# Patient Record
Sex: MALE | Race: BLACK OR AFRICAN AMERICAN | Employment: FULL TIME | URBAN - METROPOLITAN AREA
[De-identification: names, ages, dates, MRNs, and addresses within clinical notes are randomized per-mention and may not be internally consistent; named-entity substitution may affect disease eponyms.]

---

## 2018-04-24 ENCOUNTER — OFFICE VISIT (OUTPATIENT)
Dept: ORTHOPEDIC SURGERY | Age: 53
End: 2018-04-24

## 2018-04-24 VITALS
DIASTOLIC BLOOD PRESSURE: 95 MMHG | HEART RATE: 62 BPM | RESPIRATION RATE: 16 BRPM | WEIGHT: 229.4 LBS | BODY MASS INDEX: 31.07 KG/M2 | SYSTOLIC BLOOD PRESSURE: 144 MMHG | HEIGHT: 72 IN

## 2018-04-24 DIAGNOSIS — M96.1 LUMBAR POSTLAMINECTOMY SYNDROME: Primary | ICD-10-CM

## 2018-04-24 DIAGNOSIS — M47.817 LUMBOSACRAL SPONDYLOSIS WITHOUT MYELOPATHY: ICD-10-CM

## 2018-04-24 DIAGNOSIS — M51.36 DDD (DEGENERATIVE DISC DISEASE), LUMBAR: ICD-10-CM

## 2018-04-24 RX ORDER — TRAMADOL HYDROCHLORIDE 50 MG/1
TABLET ORAL
Refills: 0 | COMMUNITY
Start: 2018-04-11 | End: 2020-05-26

## 2018-04-24 RX ORDER — TOPIRAMATE 25 MG/1
TABLET ORAL
Qty: 90 TAB | Refills: 1 | Status: SHIPPED | OUTPATIENT
Start: 2018-04-24 | End: 2020-05-26

## 2018-04-24 NOTE — PROGRESS NOTES
[facility]  [address], [place]   Phone: [phone]  Fax: [phone]        INITIAL CONSULTATION      HISTORY OF PRESENT ILLNESS:  Hugo Shetty is a 45 y.o. male whom is referred from Dr. Paulino Jon secondary to progressive left flank pain ongoing since ~ mid-March 2018 after bending over to  his shoe. His pain is exacerbated with prolonged standing and sitting. He rates pain 4-10/10. This is a patient with a diagnosis of postlaminectomy syndrome with previous h/o L4-S1 fusion from 1999/2000 performed by Dr. Jewel Soto. Pt states he was symptom-free until his recent recurrence. Note from Dr. Paulino Jon dated 3/23/18 indicating patient was seen for possible shingles and was treated with Prednisone. Reviewing his notes, patient apparently had abnormal renal lab studies with creatinine 1.8 and BUN 28 (repeat lab studies revealed creatinine 1.2, BUN 18). Pt receive no relief with Prednisone from 3/23/18. He has tried Tylenol without relief. Pt has taken Topamax in the past. PMHx includes gout. Pt denies recent lumbar blocks or PT. Pt denies h/o DM or glaucoma. Pt denies change in bowel or bladder habits. Pt denies fever, weight loss, or skin changes. Rib uni w/ chest XR dated 3/23/2018 per report revealed no active intrathoracic disease. No acute rib fracture. Thoracolumbar spine XR dated 3/23/2018 reviewed. Per report, no acute pathology. Postoperative change at L4-S1. Multilevel degenerative changes most significant at L2-3. Mild convex left scoliosis with anterior subluxation L4 on L5. Reviewing the films myself, bony fusion appears to be solid at L4-5, maybe not as solid at the L5-S1 level. Vacuum disc at L2-3 with significant disc space narrowing. Anterior osteophytes noted throughout the lumbar spine. No acute pathology identified. The patient is LHD.  reviewed. Body mass index is 31.11 kg/(m^2).         PCP: Paulino Jon MD    Past Medical History:   Diagnosis Date    Elevated PSA     Gout     Hypercalcemia     Hyperlipidemia           Past Surgical History:   Procedure Laterality Date    HX BACK SURGERY      HX HIP REPLACEMENT Right     HX HIP REPLACEMENT Left     HX KNEE REPLACEMENT Left     HX OTHER SURGICAL      SHOULDER SURGERY         Social History   Substance Use Topics    Smoking status: Former Smoker     Quit date: 7/7/2003    Smokeless tobacco: Never Used    Alcohol use 0.0 oz/week     0 Standard drinks or equivalent per week     Work status: Not available. Marital status: Not available. Current Outpatient Prescriptions   Medication Sig Dispense Refill    topiramate (TOPAMAX) 25 mg tablet 3 tabs PO QHS 90 Tab 1    colchicine 0.6 mg tablet   2    allopurinol (ZYLOPRIM) 100 mg tablet   5    valsartan (DIOVAN) 160 mg tablet Take  by mouth daily.  atorvastatin (LIPITOR) 10 mg tablet Take  by mouth daily.  diclofenac EC (VOLTAREN) 75 mg EC tablet Take  by mouth.  metoprolol (LOPRESSOR) 100 mg tablet Take  by mouth two (2) times a day.  traMADol (ULTRAM) 50 mg tablet take 1 tablet by mouth every 6 hours if needed  0    acyclovir (ZOVIRAX) 200 mg capsule Take  by mouth every four (4) hours (while awake).  loteprednol etabonate (LOTEMAX) 0.5 % ophthalmic suspension Administer 1 Drop to both eyes four (4) times daily. No Known Allergies         Family History   Problem Relation Age of Onset    Heart Disease Mother     Other Father      FAMILY HX UNKNOWN    Other Mother      SARCOIDOSIS    Other Mother      CONGESTIVE HEART FAILURE         REVIEW OF SYSTEMS  Constitutional symptoms: Negative  Eyes: Negative  Ears, Nose, Throat, and Mouth: Negative  Cardiovascular: Negative  Respiratory: Negative  Genitourinary: Negative  Integumentary (Skin and/or breast): Negative  Musculoskeletal: Positive for left flank pain. Extremities: Negative for edema.   Endocrine/Rheumatologic: Negative  Hematologic/Lymphatic: Negative  Allergic/Immunologic: Negative  Psychiatric: Negative       PHYSICAL EXAMINATION    Visit Vitals    BP (!) 144/95  Comment: pt has not taken his bp med yet    Pulse 62    Resp 16    Ht 6' (1.829 m)    Wt 104.1 kg (229 lb 6.4 oz)    BMI 31.11 kg/m2       CONSTITUTIONAL: NAD, A&O x 3  HEART: Regular rate and rhythm  ABDOMEN: Positive bowel sounds, soft, nontender, and nondistended  LUNGS: Clear to auscultation bilaterally. SKIN: Negative for rash. RANGE OF MOTION: The patient has full passive range of motion in all four extremities. SENSATION: Sensation is intact to light touch throughout. MOTOR:   Straight Leg Raise: Negative, bilateral  Landa: Negative, bilateral   Deep tendon reflexes are 0 at the brachioradialis, biceps, and triceps. Deep tendon reflexes are 0 at the knees and ankles bilaterally. Shoulder AB/Flex Elbow Flex Wrist Ext Elbow Ext Wrist Flex Hand Intrin Tone   Right +4/5 +4/5 +4/5 +4/5 +4/5 +4/5 +4/5   Left +4/5 +4/5 +4/5 +4/5 +4/5 +4/5 +4/5              Hip Flex Knee Ext Knee Flex Ankle DF GTE Ankle PF Tone   Right +4/5 +4/5 +4/5 +4/5 +4/5 +4/5 +4/5   Left +4/5 +4/5 +4/5 +4/5 +4/5 +4/5 +4/5         ASSESSMENT   Diagnoses and all orders for this visit:    1. Lumbar postlaminectomy syndrome    2. Lumbosacral spondylosis without myelopathy    3. DDD (degenerative disc disease), lumbar    Other orders  -     topiramate (TOPAMAX) 25 mg tablet; 3 tabs PO QHS           IMPRESSIONS/RECOMMENDATIONS:  The patient presents for left flank pain x 1 month. He is neurologically intact. The most significant findings were noted to be at the L2-3 level on his thoracolumbar radiographs. We discussed multiple treatment options. patient would like to proceed conservatively. I will try him on Topamax 25 mg 3 tabs qhs ramped. The risks, benefits, and potential side effects of this medication were discussed. Patient understands and wishes to proceed.  Patient advised to call the office if intolerant to new medication. He declines physical therapy. I will see the patient back in 1 month's time or earlier if needed. Written by Miquel Aquino, as dictated by Lee Sim MD  I examined the patient, reviewed and agree with the note.

## 2018-04-24 NOTE — MR AVS SNAPSHOT
[facility] 
 
 
 [address] [facility] 
[phone] Patient: Joseluis Treviño MRN: [id number] Visit Information Date & Time Provider Department Dept. Phone Encounter #  
 2018 10:00 AM Ervin Hyman MD [facility] [place] [phone] [id number] Upcoming Health Maintenance Date Due Hepatitis C Screening 1965 DTaP/Tdap/Td series (1 - Tdap) 10/13/1984 FOBT Q 1 YEAR AGE 50-75 10/13/2013 Influenza Age 5 to Adult 2017 Allergies as of 2018  Review Complete On: 2018 By: Trevor Joe LPN No Known Allergies Current Immunizations  Never Reviewed No immunizations on file. Not reviewed this visit You Were Diagnosed With   
  
 Codes Comments Lumbar postlaminectomy syndrome    -  Primary ICD-10-CM: M96.1 ICD-9-CM: 722.83 Lumbosacral spondylosis without myelopathy     ICD-10-CM: N86.886 ICD-9-CM: 721.3 DDD (degenerative disc disease), lumbar     ICD-10-CM: M51.36 
ICD-9-CM: 722.52 Vitals BP Pulse Resp Height(growth percentile) Weight(growth percentile) BMI  
 (!) 144/95 62 16 6' (1.829 m) 229 lb 6.4 oz (104.1 kg) 31.11 kg/m2 Smoking Status Former Smoker Vitals History BMI and BSA Data Body Mass Index Body Surface Area  
 31.11 kg/m 2 2.3 m 2 Preferred Pharmacy Pharmacy Name Phone [facility] [address] [facility] [phone] Your Updated Medication List  
  
   
This list is accurate as of 18 11:39 AM.  Always use your most recent med list.  
  
  
  
  
 acyclovir 200 mg capsule Commonly known as:  ZOVIRAX Take  by mouth every four (4) hours (while awake). allopurinol 100 mg tablet Commonly known as:  ZYLOPRIM  
  
 colchicine 0.6 mg tablet  
  
 diclofenac EC 75 mg EC tablet Commonly known as:  VOLTAREN  
 Take  by mouth. DIOVAN 160 mg tablet Generic drug:  valsartan Take  by mouth daily. LIPITOR 10 mg tablet Generic drug:  atorvastatin Take  by mouth daily. LOTEMAX 0.5 % ophthalmic suspension Generic drug:  loteprednol etabonate Administer 1 Drop to both eyes four (4) times daily. metoprolol tartrate 100 mg IR tablet Commonly known as:  LOPRESSOR Take  by mouth two (2) times a day. topiramate 25 mg tablet Commonly known as:  TOPAMAX 3 tabs PO QHS  
  
 traMADol 50 mg tablet Commonly known as:  ULTRAM  
take 1 tablet by mouth every 6 hours if needed Prescriptions Sent to Pharmacy Refills  
 topiramate (TOPAMAX) 25 mg tablet 1 Sig: 3 tabs PO QHS Class: Normal  
 Pharmacy: [id number] [address] [place] [address] #: [phone] Introducing [facility]! [facility] introduces [employer] patient portal. Now you can access parts of your medical record, email your doctor's office, and request medication refills online. 1. In your internet browser, go to [email or web address] 2. Click on the First Time User? Click Here link in the Sign In box. You will see the New Member Sign Up page. 3. Enter your [employer] Access Code exactly as it appears below. You will not need to use this code after youve completed the sign-up process. If you do not sign up before the expiration date, you must request a new code. · [employer] Access Code: [email or web address] Expires: 5/21/2018  6:01 PM 
 
4. Enter the last four digits of your Social Security Number (xxxx) and Date of Birth (mm/dd/yyyy) as indicated and click Submit. You will be taken to the next sign-up page. 5. Create a [employer] Medical[employer] ID. This will be your [employer] login ID and cannot be changed, so think of one that is secure and easy to remember. 6. Create a [employer] password. You can change your password at any time. 7. Enter your Password Reset Question and Answer. This can be used at a later time if you forget your password. 8. Enter your e-mail address. You will receive e-mail notification when new information is available in [address] 9. Click Sign Up. You can now view and download portions of your medical record. 10. Click the Download Summary menu link to download a portable copy of your medical information. If you have questions, please visit the Frequently Asked Questions section of the [employer] website. Remember, [employer] is NOT to be used for urgent needs. For medical emergencies, dial 911. Now available from your iPhone and Android! Please provide this summary of care documentation to your next provider. Your primary care clinician is listed as Penni Litten. If you have any questions after today's visit, please call [phone].

## 2020-09-07 RX ORDER — DICLOFENAC SODIUM 75 MG/1
TABLET, DELAYED RELEASE ORAL
Qty: 180 TAB | Refills: 1 | Status: SHIPPED | OUTPATIENT
Start: 2020-09-07 | End: 2021-02-02 | Stop reason: DRUGHIGH

## 2020-09-29 VITALS
WEIGHT: 241 LBS | DIASTOLIC BLOOD PRESSURE: 90 MMHG | BODY MASS INDEX: 32.64 KG/M2 | OXYGEN SATURATION: 97 % | SYSTOLIC BLOOD PRESSURE: 156 MMHG | HEART RATE: 66 BPM | HEIGHT: 72 IN | TEMPERATURE: 97.7 F

## 2020-09-29 PROBLEM — I10 ESSENTIAL HYPERTENSION: Status: ACTIVE | Noted: 2020-09-29

## 2020-09-29 RX ORDER — OMEGA-3 FATTY ACIDS 1000 MG
CAPSULE ORAL
COMMUNITY

## 2020-09-29 RX ORDER — LOTEPREDNOL ETABONATE 5 MG/ML
1 SUSPENSION/ DROPS OPHTHALMIC 4 TIMES DAILY
COMMUNITY
End: 2020-09-30

## 2020-09-29 RX ORDER — SILDENAFIL 100 MG/1
100 TABLET, FILM COATED ORAL AS NEEDED
COMMUNITY
End: 2021-02-02

## 2020-09-30 ENCOUNTER — OFFICE VISIT (OUTPATIENT)
Dept: SURGERY | Age: 55
End: 2020-09-30
Payer: COMMERCIAL

## 2020-09-30 ENCOUNTER — OFFICE VISIT (OUTPATIENT)
Dept: INTERNAL MEDICINE CLINIC | Age: 55
End: 2020-09-30
Payer: COMMERCIAL

## 2020-09-30 VITALS
RESPIRATION RATE: 14 BRPM | HEIGHT: 72 IN | OXYGEN SATURATION: 98 % | DIASTOLIC BLOOD PRESSURE: 100 MMHG | HEART RATE: 58 BPM | WEIGHT: 225.4 LBS | BODY MASS INDEX: 30.53 KG/M2 | TEMPERATURE: 97.6 F | SYSTOLIC BLOOD PRESSURE: 165 MMHG

## 2020-09-30 VITALS — TEMPERATURE: 97.8 F

## 2020-09-30 DIAGNOSIS — K62.89 PROCTITIS: ICD-10-CM

## 2020-09-30 DIAGNOSIS — I10 ESSENTIAL HYPERTENSION: Primary | ICD-10-CM

## 2020-09-30 DIAGNOSIS — K62.89 RECTAL PAIN: ICD-10-CM

## 2020-09-30 DIAGNOSIS — K59.00 CONSTIPATION, UNSPECIFIED CONSTIPATION TYPE: ICD-10-CM

## 2020-09-30 DIAGNOSIS — K64.8 BLEEDING INTERNAL HEMORRHOIDS: Primary | ICD-10-CM

## 2020-09-30 PROCEDURE — 99215 OFFICE O/P EST HI 40 MIN: CPT | Performed by: INTERNAL MEDICINE

## 2020-09-30 PROCEDURE — 99203 OFFICE O/P NEW LOW 30 MIN: CPT | Performed by: SURGERY

## 2020-09-30 RX ORDER — BISACODYL 5 MG
10 TABLET, DELAYED RELEASE (ENTERIC COATED) ORAL DAILY
Qty: 30 TAB | Refills: 1 | Status: SHIPPED | OUTPATIENT
Start: 2020-09-30 | End: 2021-02-02

## 2020-09-30 RX ORDER — AMLODIPINE BESYLATE 5 MG/1
5 TABLET ORAL DAILY
Qty: 30 TAB | Refills: 1 | Status: SHIPPED | OUTPATIENT
Start: 2020-09-30 | End: 2020-11-30

## 2020-09-30 RX ORDER — HYDROCORTISONE ACETATE, PRAMOXINE HCL 2.5; 1 G/100G; G/100G
CREAM TOPICAL 3 TIMES DAILY
Qty: 28.4 G | Refills: 0 | Status: SHIPPED | OUTPATIENT
Start: 2020-09-30 | End: 2020-11-30

## 2020-09-30 RX ORDER — AMOXICILLIN AND CLAVULANATE POTASSIUM 875; 125 MG/1; MG/1
1 TABLET, FILM COATED ORAL 2 TIMES DAILY
Qty: 28 TAB | Refills: 0 | Status: SHIPPED | OUTPATIENT
Start: 2020-09-30 | End: 2020-11-30

## 2020-09-30 NOTE — PROGRESS NOTES
Xavi Moreira presents today for   Chief Complaint   Patient presents with    Skin Problem     Referal from Dr Taryn Ridley       Is someone accompanying this pt? no    Is the patient using any DME equipment during [address]? no    Depression Screening:  3 most recent PHQ Screens 9/30/2020   Little interest or pleasure in doing things Not at all   Feeling down, depressed, irritable, or hopeless Not at all   Total Score PHQ 2 0       Learning Assessment:  Learning Assessment 9/30/2020   PRIMARY LEARNER Patient   HIGHEST LEVEL OF EDUCATION - PRIMARY LEARNER  GRADUATED HIGH SCHOOL OR GED   PRIMARY LANGUAGE ENGLISH   LEARNER PREFERENCE PRIMARY READING   ANSWERED BY patient   RELATIONSHIP SELF       Abuse Screening:  No flowsheet data found. Fall Risk  No flowsheet data found. Coordination of Care:  1. Have you been to the ER, urgent care clinic since your last visit? Hospitalized since your last visit? NewPatient    2. Have you seen or consulted any other health care providers outside of the [address] [place] [address] since your last visit? Include any pap smears or colon screening.  NA

## 2020-09-30 NOTE — PROGRESS NOTES
1. Essential hypertension  Pressure is not controlled here we will go ahead and start him on Norvasc 5 daily to supplement his current meds we will check an EKG and also metabolic panel panel  - amLODIPine (NORVASC) 5 mg tablet; Take 1 Tab by mouth daily. Dispense: 30 Tab; Refill: 1  - EKG, 12 LEAD, INITIAL; Future  - METABOLIC PANEL, COMPREHENSIVE; Future    2. Constipation, unspecified constipation type  I have no doubt that severe constipation led to his current predicament will start Dulcolax 2 tabs daily  - bisacodyL (DULCOLAX) 5 mg EC tablet; Take 2 Tabs by mouth daily. Dispense: 30 Tab; Refill: 1    3. Proctitis  He either has proctitis with an infected anal gland or an early thrombosed hemorrhoid. I am referring him to surgery immediately I discussed with the  and he has an appointment this morning at 9:45 AM.  We will start Augmentin and check a CBC with differential.  - amoxicillin-clavulanate (AUGMENTIN) 875-125 mg per tablet; Take 1 Tab by mouth two (2) times a day for 10 days. Dispense: 28 Tab; Refill: 0  - CBC WITH AUTOMATED DIFF; Future    4. Rectal pain  Already on high-dose NSAIDs I am going to add Proctofoam which should help in decreasing some of the inflammation. We will see what surgery has to say  - REFERRAL TO GENERAL SURGERY  - CBC WITH AUTOMATED DIFF; Future  - pramoxine-hydrocortisone (PROTOFOAM-HC) 2.5-1 % topical cream; Apply  to affected area three (3) times daily.   Dispense: 28.4 g; Refill: 0  Total time spent was greater than 40 minutes more than 50% spent in counseling and coordination of care including setting up urgent appointments with surgery and extensive discussions with patient regarding his symptoms      Chief Complaint   Patient presents with    Hypertension    Constipation     painful, burning, little bit of bleeding at times, itching x 3 weeks        HPI   Is a very pleasant 58-year-old gentleman who presents for 2 primary reasons for she has been having rectal pain for the past month. It all started a month ago after he had a severe bout of constipation. After he had a bowel movement he noticed it became somewhat painful and itchy. Over the next few weeks it got worse and worse and at this point he is scared to have a bowel movement. The pain is described as 6 out of 10 while having a bowel movement. He recently had a rectal exam by his urologist for which by which he is being worked up for an elevated PSA. He noted that was exquisitely painful as well. He is not having any fevers or chills. The other issue is that he is also having elevated blood pressure readings that have been noted on some of his screenings. Systolic pressures have almost consistently been greater than 160/100. At no time has he had any chest pain palpitation shortness of breath or exertional dyspnea. He also has no leg swelling. He reports he has been in pain and anxious over the past month and that may be contributing to his elevated blood pressure. Patient Active Problem List   Diagnosis Code    Hyperlipidemia E78.5    Gout M10.9    Hypercalcemia E83.52    Elevated PSA R97.20    Lumbar postlaminectomy syndrome M96.1    Lumbosacral spondylosis without myelopathy M47.817    DDD (degenerative disc disease), lumbar M51.36    Essential hypertension I10        Current Outpatient Medications on File Prior to Visit   Medication Sig Dispense Refill    omega-3 fatty acids (Fish Oil Concentrate) 1,000 mg cap Take  by mouth.  sildenafil citrate (VIAGRA) 100 mg tablet Take 100 mg by mouth as needed for Erectile Dysfunction.       diclofenac EC (VOLTAREN) 75 mg EC tablet take 1 tablet by mouth twice a day 180 Tab 1    valsartan-hydroCHLOROthiazide (DIOVAN-HCT) 160-25 mg per tablet take 1 tablet by mouth once daily      metoprolol succinate (TOPROL-XL) 100 mg tablet       colchicine 0.6 mg tablet   2    allopurinol (ZYLOPRIM) 100 mg tablet   5    atorvastatin (LIPITOR) 10 mg tablet Take  by mouth daily. No current facility-administered medications on file prior to visit. ROS  - GEN: no weight gain/loss, no fevers or chills  - HEENT: no vision changes, no tinnitus, no sore throat  - CV: no cp, palpitations or edema  - RESP: no sob, cough  - ABD: no n/v/d, no blood in stool  - : no dysuria or changes in freq. - SKIN: no rashes, ulcers  - Neuro: no resting tremors, parasthesia in extremities, no headaches  - MS: No weakness in extremities, no gait abnormalities  - Psych: negative for depression or anxiety  - rectal: painful with defication    Visit Vitals  BP (!) 165/100 (BP 1 Location: Right arm, BP Patient Position: Sitting)   Pulse (!) 58   Temp 97.6 °F (36.4 °C) (Temporal)   Resp 14   Ht 5' 11.5\" (1.816 m)   Wt 225 lb 6.4 oz (102.2 kg)   SpO2 98%   BMI 31.00 kg/m²           Physical Exam  Constitutional:       Appearance: Normal appearance. weight. NAD and pleasant  HENT:      Head: Normocephalic. Nose: Nose normal.      Mouth/Throat:      Mouth: Mucous membranes are moist. Throat not inflammed  Eyes:      Extraocular Movements: Extraocular movements intact. Conjunctiva/sclera: Conjunctivae normal. Sclera anicteric     Pupils: Pupils are equal, round, and reactive to light. Cardiovascular:      Rate and Rhythm: Normal rate and regular rhythm. Pulses: Normal pulses. Pulmonary:      Effort: No respiratory distress. Breath sounds: CTAB and No stridor. No rhonchi. Abdominal:      General: There is no distension. NT, ND  Neurological:      Mental Status: patient is alert and oriented times 3. No resting tremor, normal gait     Cranial Nerves: cranial nerves grossly intact  Muskuloskeletal     Full ROM in extremities     Normal gait  Skin     Dry without lesions on examined areas, warm to the touch  Rectal: there is a 1cm to 1.5 cm mass an inch and a half into the rectum at the 8-9 oclock position. Exquisitely tender.   Psychiatry Calm, normal affect, interacting normally

## 2020-09-30 NOTE — PROGRESS NOTES
Physical assessment being performed by MD. Travis Roth presents today for   Chief Complaint   Patient presents with    Hypertension    Constipation     painful, burning, little bit of bleeding at times, itching x 3 weeks       Is someone accompanying this pt? No     Is the patient using any DME equipment during OV?no    Depression Screening:  3 most recent PHQ Screens 9/30/2020   Little interest or pleasure in doing things Not at all   Feeling down, depressed, irritable, or hopeless Not at all   Total Score PHQ 2 0       Learning Assessment:  Learning Assessment 9/30/2020   PRIMARY LEARNER Patient   HIGHEST LEVEL OF EDUCATION - PRIMARY LEARNER  GRADUATED HIGH SCHOOL OR GED   PRIMARY LANGUAGE ENGLISH   LEARNER PREFERENCE PRIMARY READING   ANSWERED BY patient   RELATIONSHIP SELF         Health Maintenance reviewed and discussed and ordered per Provider. Health Maintenance Due   Topic Date Due    Hepatitis C Screening  1965    Lipid Screen  10/13/1973    DTaP/Tdap/Td series (1 - Tdap) 10/13/1984    Shingrix Vaccine Age 50> (1 of 2) 10/13/2013    FOBT Q1Y Age 50-75  10/13/2013    Flu Vaccine (1) 09/01/2020   . Coordination of Care:  1. Have you been to the ER, urgent care clinic since your last visit? Hospitalized since your last visit? no    2. Have you seen or consulted any other health care providers outside of the [address] [place] [address] since your last visit? Include any pap smears or colon screening.  No

## 2020-09-30 NOTE — PROGRESS NOTES
History of Present Illness  Dallin Tidwell is a 62 y.o. male presents for Skin Problem (Referal from Dr Pretty Orlando)     This is a 47-year-old male that has been having issues with painful rectal bleeding for the last month. He reports ongoing issues with constipation. He does notice blood on his stool with bowel movements and feels like there is a mass on the inside of his rectum. He feels that this is on the left side where he can appreciate some fullness. He denies any similar episodes of this in the past.  He has had a colonoscopy approximately 5 years ago and reports this was normal.    Review of Systems   Constitutional: Negative for chills, diaphoresis, fever, malaise/fatigue and weight loss. Gastrointestinal: Positive for blood in stool and constipation. Negative for abdominal pain, diarrhea, heartburn, melena, nausea and vomiting. Genitourinary: Negative for dysuria, frequency and urgency. Musculoskeletal: Negative for back pain, joint pain, myalgias and neck pain. Skin: Negative for itching and rash. Neurological: Negative for dizziness and headaches. Endo/Heme/Allergies: Does not bruise/bleed easily. Psychiatric/Behavioral: Negative for depression and suicidal ideas. Current Outpatient Medications:     hydrocortisone-pramoxine (PROCTOFOAM HC) rectal foam, Insert 1 Applicator into rectum two (2) times a day., Disp: 1 Can, Rfl: 0    amoxicillin-clavulanate (AUGMENTIN) 875-125 mg per tablet, Take 1 Tab by mouth two (2) times a day for 10 days. , Disp: 28 Tab, Rfl: 0    amLODIPine (NORVASC) 5 mg tablet, Take 1 Tab by mouth daily. , Disp: 30 Tab, Rfl: 1    bisacodyL (DULCOLAX) 5 mg EC tablet, Take 2 Tabs by mouth daily. , Disp: 30 Tab, Rfl: 1    pramoxine-hydrocortisone (PROTOFOAM-HC) 2.5-1 % topical cream, Apply  to affected area three (3) times daily. , Disp: 28.4 g, Rfl: 0    omega-3 fatty acids (Fish Oil Concentrate) 1,000 mg cap, Take  by mouth., Disp: , Rfl:     sildenafil citrate (VIAGRA) 100 mg tablet, Take 100 mg by mouth as needed for Erectile Dysfunction. , Disp: , Rfl:     diclofenac EC (VOLTAREN) 75 mg EC tablet, take 1 tablet by mouth twice a day, Disp: 180 Tab, Rfl: 1    valsartan-hydroCHLOROthiazide (DIOVAN-HCT) 160-25 mg per tablet, take 1 tablet by mouth once daily, Disp: , Rfl:     metoprolol succinate (TOPROL-XL) 100 mg tablet, , Disp: , Rfl:     colchicine 0.6 mg tablet, , Disp: , Rfl: 2    allopurinol (ZYLOPRIM) 100 mg tablet, , Disp: , Rfl: 5    atorvastatin (LIPITOR) 10 mg tablet, Take  by mouth daily. , Disp: , Rfl:     No Known Allergies    Past Medical History:   Diagnosis Date    Elevated PSA     Gout     Hypercalcemia     Hyperlipidemia     Hypertension        Past Surgical History:   Procedure Laterality Date    HX BACK SURGERY      HX COLONOSCOPY      HX HIP REPLACEMENT Right     HX HIP REPLACEMENT Left     HX KNEE REPLACEMENT Left     HX OTHER SURGICAL      SHOULDER SURGERY       Family History   Problem Relation Age of Onset    Heart Disease Mother     Other Mother         SARCOIDOSIS/CONGESTIVE HEART FAILURE    Other Father         FAMILY HX UNKNOWN        Social History     Socioeconomic History    Marital status:      Spouse name: Not on file    Number of children: Not on file    Years of education: Not on file    Highest education level: Not on file   Occupational History    Not on file   Social Needs    Financial resource strain: Not on file    Food insecurity     Worry: Not on file     Inability: Not on file    Transportation needs     Medical: Not on file     Non-medical: Not on file   Tobacco Use    Smoking status: Former Smoker     Last attempt to quit: 2003     Years since quittin.2    Smokeless tobacco: Never Used   Substance and Sexual Activity    Alcohol use:  Yes     Alcohol/week: 0.0 standard drinks    Drug use: No    Sexual activity: Not on file   Lifestyle    Physical activity     Days per week: Not on file     Minutes per session: Not on file    Stress: Not on file   Relationships    Social connections     Talks on phone: Not on file     Gets together: Not on file     Attends Nondenominational service: Not on file     Active member of club or organization: Not on file     Attends meetings of clubs or organizations: Not on file     Relationship status: Not on file    Intimate partner violence     Fear of current or ex partner: Not on file     Emotionally abused: Not on file     Physically abused: Not on file     Forced sexual activity: Not on file   Other Topics Concern    Not on file   Social History Narrative    Not on file       XR Results (maximum last 2): Results from Abstract encounter on 05/15/18   XR RIBS LT W PA CXR MIN 3 V   XR SPINE THORAC 2 V       CT Results (maximum last 2): No results found for this or any previous visit. MRI Results (maximum last 2): No results found for this or any previous visit. Nuclear Medicine Results (maximum last 3): No results found for this or any previous visit. US Results (maximum last 2): No results found for this or any previous visit. VARUN Results (maximum last 2): No results found for this or any previous visit. IR Results (maximum last 2): No results found for this or any previous visit. VAS/US Results (maximum last 2): No results found for this or any previous visit. PET Results (maximum last 2): No results found for this or any previous visit. Visit Vitals  Temp 97.8 °F (36.6 °C)        Physical Exam  Constitutional:       Appearance: Normal appearance. He is normal weight. HENT:      Head: Normocephalic and atraumatic. Eyes:      Extraocular Movements: Extraocular movements intact. Pupils: Pupils are equal, round, and reactive to light. Neck:      Musculoskeletal: Normal range of motion. Pulmonary:      Effort: Pulmonary effort is normal.   Abdominal:      General: Abdomen is flat.  Bowel sounds are normal. Palpations: Abdomen is soft. Comments: Anoscope exam performed- large internal hemorrhoids left 9 oclock position   Musculoskeletal: Normal range of motion. Neurological:      General: No focal deficit present. Mental Status: He is alert and oriented to person, place, and time. Mental status is at baseline. Psychiatric:         Mood and Affect: Mood normal.         Behavior: Behavior normal.         Thought Content: Thought content normal.         Judgment: Judgment normal.         Assessment and Plan:  1. Bleeding internal hemorrhoids  I recommended conservative management. He can start Anusol suppositories 3 times a day as well as the Proctofoam cream that we will call into his pharmacy. He should take Colace over-the-counter stool softeners daily to maintain regular soft bowel movements. I will see him back in 6 weeks to reevaluate the hemorrhoid. He agrees with this plan and will call prior to this time if he has any questions or concerns.       Casi Madrigal, DO    CC Providers:  Scotty Warren.MD Scotty., *

## 2020-10-12 RX ORDER — VALSARTAN AND HYDROCHLOROTHIAZIDE 160; 25 MG/1; MG/1
TABLET ORAL
Qty: 90 TAB | Refills: 1 | Status: SHIPPED | OUTPATIENT
Start: 2020-10-12 | End: 2021-03-26

## 2020-10-28 ENCOUNTER — OFFICE VISIT (OUTPATIENT)
Dept: SURGERY | Age: 55
End: 2020-10-28
Payer: COMMERCIAL

## 2020-10-28 VITALS — BODY MASS INDEX: 32.06 KG/M2 | HEIGHT: 71 IN | TEMPERATURE: 97.8 F | WEIGHT: 229 LBS

## 2020-10-28 DIAGNOSIS — K64.8 BLEEDING INTERNAL HEMORRHOIDS: Primary | ICD-10-CM

## 2020-10-28 PROCEDURE — 99213 OFFICE O/P EST LOW 20 MIN: CPT | Performed by: SURGERY

## 2020-10-28 NOTE — PROGRESS NOTES
History of Present Illness  Wilber Wilcox is a 60 y.o. male presents for Follow-up (6 week follow up)     This is a 47-year-old male that is here for 6-week follow-up with history of bleeding internal hemorrhoids. He states the bleeding has stopped completely. He has been using the topical creams as recommended. He is using occasionally Dulcolax for constipation. He reports on Sunday he did have a large hard bowel movement and this did trigger some burning pain but that did resolve on its own. Bowel movement otherwise are more regular. Review of Systems   Constitutional: Negative for chills, diaphoresis, fever, malaise/fatigue and weight loss. Gastrointestinal: Positive for constipation. Negative for abdominal pain, blood in stool, diarrhea, heartburn, melena, nausea and vomiting. Genitourinary: Negative for dysuria, frequency and urgency. Musculoskeletal: Negative for back pain, joint pain, myalgias and neck pain. Skin: Negative for itching and rash. Neurological: Negative for dizziness and headaches. Endo/Heme/Allergies: Does not bruise/bleed easily. Psychiatric/Behavioral: Negative for depression and suicidal ideas. Current Outpatient Medications:     valsartan-hydroCHLOROthiazide (DIOVAN-HCT) 160-25 mg per tablet, take 1 tablet by mouth once daily, Disp: 90 Tab, Rfl: 1    amLODIPine (NORVASC) 5 mg tablet, Take 1 Tab by mouth daily. , Disp: 30 Tab, Rfl: 1    bisacodyL (DULCOLAX) 5 mg EC tablet, Take 2 Tabs by mouth daily. , Disp: 30 Tab, Rfl: 1    pramoxine-hydrocortisone (PROTOFOAM-HC) 2.5-1 % topical cream, Apply  to affected area three (3) times daily. , Disp: 28.4 g, Rfl: 0    hydrocortisone-pramoxine (PROCTOFOAM HC) rectal foam, Insert 1 Applicator into rectum two (2) times a day., Disp: 1 Can, Rfl: 0    omega-3 fatty acids (Fish Oil Concentrate) 1,000 mg cap, Take  by mouth., Disp: , Rfl:     sildenafil citrate (VIAGRA) 100 mg tablet, Take 100 mg by mouth as needed for Erectile Dysfunction. , Disp: , Rfl:     diclofenac EC (VOLTAREN) 75 mg EC tablet, take 1 tablet by mouth twice a day, Disp: 180 Tab, Rfl: 1    metoprolol succinate (TOPROL-XL) 100 mg tablet, , Disp: , Rfl:     colchicine 0.6 mg tablet, , Disp: , Rfl: 2    allopurinol (ZYLOPRIM) 100 mg tablet, , Disp: , Rfl: 5    atorvastatin (LIPITOR) 10 mg tablet, Take  by mouth daily. , Disp: , Rfl:     No Known Allergies    Past Medical History:   Diagnosis Date    Elevated PSA     Gout     Hypercalcemia     Hyperlipidemia     Hypertension        Past Surgical History:   Procedure Laterality Date    HX BACK SURGERY      HX COLONOSCOPY      HX HIP REPLACEMENT Right     HX HIP REPLACEMENT Left     HX KNEE REPLACEMENT Left     HX OTHER SURGICAL      SHOULDER SURGERY       Family History   Problem Relation Age of Onset    Heart Disease Mother     Other Mother         SARCOIDOSIS/CONGESTIVE HEART FAILURE    Other Father         FAMILY HX UNKNOWN        Social History     Socioeconomic History    Marital status:      Spouse name: Not on file    Number of children: Not on file    Years of education: Not on file    Highest education level: Not on file   Occupational History    Not on file   Social Needs    Financial resource strain: Not on file    Food insecurity     Worry: Not on file     Inability: Not on file    Transportation needs     Medical: Not on file     Non-medical: Not on file   Tobacco Use    Smoking status: Former Smoker     Last attempt to quit: 2003     Years since quittin.3    Smokeless tobacco: Never Used   Substance and Sexual Activity    Alcohol use:  Yes     Alcohol/week: 0.0 standard drinks    Drug use: No    Sexual activity: Not on file   Lifestyle    Physical activity     Days per week: Not on file     Minutes per session: Not on file    Stress: Not on file   Relationships    Social connections     Talks on phone: Not on file     Gets together: Not on file Attends Restoration service: Not on file     Active member of club or organization: Not on file     Attends meetings of clubs or organizations: Not on file     Relationship status: Not on file    Intimate partner violence     Fear of current or ex partner: Not on file     Emotionally abused: Not on file     Physically abused: Not on file     Forced sexual activity: Not on file   Other Topics Concern    Not on file   Social History Narrative    Not on file       XR Results (maximum last 2): Results from Abstract encounter on 05/15/18   XR RIBS LT W PA CXR MIN 3 V   XR SPINE THORAC 2 V       CT Results (maximum last 2): No results found for this or any previous visit. MRI Results (maximum last 2): No results found for this or any previous visit. Nuclear Medicine Results (maximum last 3): No results found for this or any previous visit. US Results (maximum last 2): No results found for this or any previous visit. VARUN Results (maximum last 2): No results found for this or any previous visit. IR Results (maximum last 2): No results found for this or any previous visit. VAS/US Results (maximum last 2): No results found for this or any previous visit. PET Results (maximum last 2): No results found for this or any previous visit. Visit Vitals  Temp 97.8 °F (36.6 °C)   Ht 5' 11\" (1.803 m)   Wt 103.9 kg (229 lb)   BMI 31.94 kg/m²        Physical Exam  Constitutional:       Appearance: Normal appearance. He is normal weight. HENT:      Head: Normocephalic and atraumatic. Eyes:      Extraocular Movements: Extraocular movements intact. Pupils: Pupils are equal, round, and reactive to light. Neck:      Musculoskeletal: Normal range of motion. Pulmonary:      Effort: Pulmonary effort is normal.   Musculoskeletal: Normal range of motion. Neurological:      General: No focal deficit present. Mental Status: He is alert and oriented to person, place, and time.  Mental status is at baseline. Psychiatric:         Mood and Affect: Mood normal.         Behavior: Behavior normal.         Thought Content: Thought content normal.         Judgment: Judgment normal.         Assessment and Plan:  1. Bleeding internal hemorrhoids  The bleeding has resolved. We discussed continuing to avoid constipation and recommended over-the-counter MiraLAX as needed and discouraged him from using long-term stimulant laxatives. He understands these instructions. He can use topical creams as well as Anusol suppository as needed for when they occasionally flare but otherwise I do not think any intervention is necessary. He agrees with this plan will call in the future as needed. Marek Allen, DO    CC Providers:  Cooper Harrell., MD  No ref.  provider found

## 2020-10-28 NOTE — PROGRESS NOTES
Scheryl Favre presents today for   Chief Complaint   Patient presents with    Follow-up     6 week follow up       Is someone accompanying this pt? no    Is the patient using any DME equipment during [address]? no    Depression Screening:  3 most recent PHQ Screens 9/30/2020   Little interest or pleasure in doing things Not at all   Feeling down, depressed, irritable, or hopeless Not at all   Total Score PHQ 2 0       Learning Assessment:  Learning Assessment 9/30/2020   PRIMARY LEARNER Patient   HIGHEST LEVEL OF EDUCATION - PRIMARY LEARNER  GRADUATED HIGH SCHOOL OR GED   PRIMARY LANGUAGE ENGLISH   LEARNER PREFERENCE PRIMARY READING   ANSWERED BY patient   RELATIONSHIP SELF       Abuse Screening:  No flowsheet data found. Fall Risk  No flowsheet data found. Coordination of Care:  1. Have you been to the ER, urgent care clinic since your last visit? Hospitalized since your last visit? no    2. Have you seen or consulted any other health care providers outside of the [address] [place] [address] since your last visit? Include any pap smears or colon screening.  no

## 2020-11-25 RX ORDER — METOPROLOL SUCCINATE 100 MG/1
TABLET, EXTENDED RELEASE ORAL
Qty: 90 TAB | Refills: 3 | Status: SHIPPED | OUTPATIENT
Start: 2020-11-25 | End: 2021-11-27

## 2020-11-30 DIAGNOSIS — K62.89 RECTAL PAIN: ICD-10-CM

## 2020-11-30 DIAGNOSIS — K62.89 PROCTITIS: ICD-10-CM

## 2020-11-30 DIAGNOSIS — I10 ESSENTIAL HYPERTENSION: ICD-10-CM

## 2020-11-30 RX ORDER — AMOXICILLIN AND CLAVULANATE POTASSIUM 875; 125 MG/1; MG/1
TABLET, FILM COATED ORAL
Qty: 28 TAB | Refills: 0 | Status: SHIPPED | OUTPATIENT
Start: 2020-11-30 | End: 2021-02-02

## 2020-11-30 RX ORDER — HYDROCORTISONE ACETATE, PRAMOXINE HCL 2.5; 1 G/100G; G/100G
CREAM TOPICAL
Qty: 28.4 G | Refills: 2 | Status: SHIPPED | OUTPATIENT
Start: 2020-11-30 | End: 2021-02-02

## 2020-11-30 RX ORDER — AMLODIPINE BESYLATE 5 MG/1
TABLET ORAL
Qty: 30 TAB | Refills: 5 | Status: SHIPPED | OUTPATIENT
Start: 2020-11-30 | End: 2021-02-02 | Stop reason: SDUPTHER

## 2020-12-16 ENCOUNTER — TELEPHONE (OUTPATIENT)
Dept: INTERNAL MEDICINE CLINIC | Age: 55
End: 2020-12-16

## 2020-12-16 DIAGNOSIS — K64.1 GRADE II HEMORRHOIDS: Primary | ICD-10-CM

## 2021-01-27 RX ORDER — ALLOPURINOL 100 MG/1
TABLET ORAL
Qty: 90 TAB | Refills: 0 | Status: SHIPPED | OUTPATIENT
Start: 2021-01-27 | End: 2021-05-03

## 2021-02-02 ENCOUNTER — VIRTUAL VISIT (OUTPATIENT)
Dept: INTERNAL MEDICINE CLINIC | Age: 56
End: 2021-02-02
Payer: COMMERCIAL

## 2021-02-02 DIAGNOSIS — I10 ESSENTIAL HYPERTENSION: Primary | ICD-10-CM

## 2021-02-02 DIAGNOSIS — M25.50 ARTHRALGIA, UNSPECIFIED JOINT: ICD-10-CM

## 2021-02-02 DIAGNOSIS — E78.2 MIXED HYPERLIPIDEMIA: ICD-10-CM

## 2021-02-02 PROCEDURE — 99443 PR PHYS/QHP TELEPHONE EVALUATION 21-30 MIN: CPT | Performed by: INTERNAL MEDICINE

## 2021-02-02 RX ORDER — OMEPRAZOLE 20 MG/1
20 CAPSULE, DELAYED RELEASE ORAL DAILY
Qty: 30 CAP | Refills: 2 | Status: SHIPPED | OUTPATIENT
Start: 2021-02-02 | End: 2022-04-01 | Stop reason: ALTCHOICE

## 2021-02-02 RX ORDER — DICLOFENAC SODIUM 75 MG/1
75 TABLET, DELAYED RELEASE ORAL
Qty: 90 TAB | Refills: 1 | Status: SHIPPED | OUTPATIENT
Start: 2021-02-02 | End: 2021-08-10

## 2021-02-02 RX ORDER — AMLODIPINE BESYLATE 10 MG/1
10 TABLET ORAL DAILY
Qty: 90 TAB | Refills: 1 | Status: SHIPPED | OUTPATIENT
Start: 2021-02-02 | End: 2021-08-16

## 2021-02-02 NOTE — PROGRESS NOTES
Rajan Mello presents today for   Chief Complaint   Patient presents with    Medication Refill    Hypertension     follow up       Depression Screening:  3 most recent PHQ Screens 2/2/2021   Little interest or pleasure in doing things Not at all   Feeling down, depressed, irritable, or hopeless Not at all   Total Score PHQ 2 0       Learning Assessment:  Learning Assessment 2/2/2021   PRIMARY LEARNER Patient   HIGHEST LEVEL OF EDUCATION - PRIMARY LEARNER  GRADUATED HIGH SCHOOL OR GED   BARRIERS PRIMARY LEARNER NONE   PRIMARY LANGUAGE ENGLISH   LEARNER PREFERENCE PRIMARY LISTENING   ANSWERED BY patient   RELATIONSHIP SELF       Health Maintenance reviewed and discussed and ordered per Provider. Health Maintenance Due   Topic Date Due    Hepatitis C Screening  1965    Lipid Screen  10/13/1973    COVID-19 Vaccine (1 of 2) 10/13/1979    DTaP/Tdap/Td series (1 - Tdap) 10/13/1984    Shingrix Vaccine Age 50> (1 of 2) 10/13/2013    Flu Vaccine (1) 09/01/2020   . Coordination of Care:  1. Have you been to the ER, urgent care clinic since your last visit? Hospitalized since your last visit? no    2. Have you seen or consulted any other health care providers outside of the [address] [place] [address] since your last visit? Include any pap smears or colon screening.  no

## 2021-02-02 NOTE — PROGRESS NOTES
I was at my office in [place], [place] while conducting this encounter. The patient is at home. Consent:  Patient and/or HIS/HER healthcare decision maker is aware that this patient-initiated Telehealth encounter is a billable service, with coverage as determined by patient's insurance carrier. Patient is aware that He/She may receive a bill and has provided verbal consent to proceed: Consent has been obtained within past 12 months of the date of this encounter. This virtual visit was conducted via Telephone    1. Essential hypertension  The patient reports systolic pressure of 602 and a diastolic of 92. His blood pressure is still not controlled. We will increase Norvasc from 5 mg daily to 10 mg daily. We will check a comprehensive metabolic panel. No changes to his other antihypertensive meds  - amLODIPine (NORVASC) 10 mg tablet; Take 1 Tab by mouth daily. Dispense: 90 Tab; Refill: 1  - METABOLIC PANEL, COMPREHENSIVE    2. Mixed hyperlipidemia  Continue statin. Check a lipid profile and transaminases  - LIPID PANEL     3. Joint Pain/Arthralgia unspec.  -I discussed with the patient the need to be on a PPI in order to protect his stomach from the toxic effects of chronic diclofenac. He is to start omeprazole 20 mg OTC daily I am also decreasing his dose of Voltaren from 1 tab twice daily to 1 tab daily. I also explained to Mr. Gibbons Fears that he is on other medications including colchicine allopurinol and ARB diuretic that all can strain the kidneys which requires more frequent monitoring of his renal function      Chief Complaint   Patient presents with    Medication Refill    Hypertension     follow up        HPI   This is a very pleasant 78-year-old gentleman who presents today in follow-up for his joint pain his high cholesterol and his hypertension. He reports he underwent a prostate biopsy a few months ago which was negative which made him very happy.   He has chronic aches and pains in his joints and he also has a history of gout as well. He had previously been on Voltaren 75 mg twice daily for this but over the past month or 2 has cut back to once daily since he just does not seem to needed as much. He reports he has been tolerating his medications as well and denies any new muscle aches rashes dyspepsia diarrhea or any other associated signs or symptoms. He did take his blood pressure recently noted that his diastolic pressure was still in the 90s. He has been consistently taking his antihypertensives. He denies any new swelling in his legs and overall reports he has been doing quite well. Current Outpatient Medications on File Prior to Visit   Medication Sig Dispense Refill    allopurinoL (ZYLOPRIM) 100 mg tablet take 1 tablet by mouth once daily 90 Tab 0    metoprolol succinate (TOPROL-XL) 100 mg tablet take 1 tablet by mouth once daily 90 Tab 3    valsartan-hydroCHLOROthiazide (DIOVAN-HCT) 160-25 mg per tablet take 1 tablet by mouth once daily 90 Tab 1    omega-3 fatty acids (Fish Oil Concentrate) 1,000 mg cap Take  by mouth.  colchicine 0.6 mg tablet   2    atorvastatin (LIPITOR) 10 mg tablet Take  by mouth daily. No current facility-administered medications on file prior to visit.          Patient Active Problem List   Diagnosis Code    Hyperlipidemia E78.5    Gout M10.9    Hypercalcemia E83.52    Elevated PSA R97.20    Lumbar postlaminectomy syndrome M96.1    Lumbosacral spondylosis without myelopathy M47.817    DDD (degenerative disc disease), lumbar M51.36    Essential hypertension I10             Total Time Spent on this Encounter: 21 minutes spent

## 2021-02-06 ENCOUNTER — HOSPITAL ENCOUNTER (OUTPATIENT)
Dept: LAB | Age: 56
Discharge: HOME | End: 2021-02-06
Payer: COMMERCIAL

## 2021-02-06 PROCEDURE — 80061 LIPID PANEL: CPT

## 2021-02-06 PROCEDURE — 80053 COMPREHEN METABOLIC PANEL: CPT

## 2021-02-06 PROCEDURE — 36415 COLL VENOUS BLD VENIPUNCTURE: CPT

## 2021-03-26 RX ORDER — VALSARTAN AND HYDROCHLOROTHIAZIDE 160; 25 MG/1; MG/1
TABLET ORAL
Qty: 90 TAB | Refills: 1 | Status: SHIPPED | OUTPATIENT
Start: 2021-03-26 | End: 2021-09-21

## 2021-03-26 NOTE — TELEPHONE ENCOUNTER
Spoke to patient and states he did have his labs done, called [facility] for results and states they have no labs for this patient. Called [facility] and spoke to April who states that his labs went to [facility] lab.

## 2021-03-26 NOTE — TELEPHONE ENCOUNTER
[facility] lab called me back and states that she called [facility] lab and it looks like patient will need to get labs drawn again.

## 2021-03-26 NOTE — TELEPHONE ENCOUNTER
Called [facility] lab and states they done have any labs on this patient.  Lab staff member was going to look into this and call me back

## 2021-05-03 ENCOUNTER — TELEPHONE (OUTPATIENT)
Dept: INTERNAL MEDICINE CLINIC | Age: 56
End: 2021-05-03

## 2021-05-03 DIAGNOSIS — I10 ESSENTIAL HYPERTENSION: Primary | ICD-10-CM

## 2021-05-03 RX ORDER — ALLOPURINOL 100 MG/1
TABLET ORAL
Qty: 90 TAB | Refills: 0 | Status: SHIPPED | OUTPATIENT
Start: 2021-05-03 | End: 2021-07-30

## 2021-05-04 ENCOUNTER — TELEPHONE (OUTPATIENT)
Dept: INTERNAL MEDICINE CLINIC | Age: 56
End: 2021-05-04

## 2021-05-04 NOTE — TELEPHONE ENCOUNTER
----- Message from Mary Kenyon., MD sent at 5/4/2021  7:55 AM EDT -----  Will need repeat basic metabolic panel in 1 month

## 2021-07-30 RX ORDER — ALLOPURINOL 100 MG/1
TABLET ORAL
Qty: 90 TABLET | Refills: 0 | Status: SHIPPED | OUTPATIENT
Start: 2021-07-30 | End: 2021-11-04

## 2021-08-10 RX ORDER — DICLOFENAC SODIUM 75 MG/1
TABLET, DELAYED RELEASE ORAL
Qty: 180 TABLET | Refills: 0 | Status: SHIPPED | OUTPATIENT
Start: 2021-08-10 | End: 2022-04-01 | Stop reason: SDUPTHER

## 2021-08-16 DIAGNOSIS — I10 ESSENTIAL HYPERTENSION: ICD-10-CM

## 2021-08-16 RX ORDER — AMLODIPINE BESYLATE 10 MG/1
TABLET ORAL
Qty: 90 TABLET | Refills: 1 | Status: SHIPPED | OUTPATIENT
Start: 2021-08-16 | End: 2022-04-01 | Stop reason: SDUPTHER

## 2021-09-21 RX ORDER — VALSARTAN AND HYDROCHLOROTHIAZIDE 160; 25 MG/1; MG/1
TABLET ORAL
Qty: 90 TABLET | Refills: 1 | Status: SHIPPED | OUTPATIENT
Start: 2021-09-21 | End: 2022-04-01 | Stop reason: SDUPTHER

## 2021-11-27 RX ORDER — METOPROLOL SUCCINATE 100 MG/1
TABLET, EXTENDED RELEASE ORAL
Qty: 90 TABLET | Refills: 3 | Status: SHIPPED | OUTPATIENT
Start: 2021-11-27 | End: 2022-04-01 | Stop reason: SDUPTHER

## 2021-12-03 ENCOUNTER — OFFICE VISIT (OUTPATIENT)
Dept: INTERNAL MEDICINE CLINIC | Age: 56
End: 2021-12-03
Payer: COMMERCIAL

## 2021-12-03 VITALS
HEART RATE: 63 BPM | TEMPERATURE: 97.6 F | OXYGEN SATURATION: 95 % | HEIGHT: 72 IN | BODY MASS INDEX: 31.31 KG/M2 | DIASTOLIC BLOOD PRESSURE: 64 MMHG | SYSTOLIC BLOOD PRESSURE: 132 MMHG | WEIGHT: 231.2 LBS | RESPIRATION RATE: 18 BRPM

## 2021-12-03 DIAGNOSIS — E78.2 MIXED HYPERLIPIDEMIA: ICD-10-CM

## 2021-12-03 DIAGNOSIS — R97.20 ELEVATED PSA: ICD-10-CM

## 2021-12-03 DIAGNOSIS — N52.9 ERECTILE DYSFUNCTION, UNSPECIFIED ERECTILE DYSFUNCTION TYPE: ICD-10-CM

## 2021-12-03 DIAGNOSIS — I10 ESSENTIAL HYPERTENSION: Primary | ICD-10-CM

## 2021-12-03 DIAGNOSIS — Z00.00 ANNUAL PHYSICAL EXAM: ICD-10-CM

## 2021-12-03 DIAGNOSIS — Z23 ENCOUNTER FOR IMMUNIZATION: ICD-10-CM

## 2021-12-03 PROCEDURE — 99396 PREV VISIT EST AGE 40-64: CPT | Performed by: INTERNAL MEDICINE

## 2021-12-03 PROCEDURE — 99214 OFFICE O/P EST MOD 30 MIN: CPT | Performed by: INTERNAL MEDICINE

## 2021-12-03 PROCEDURE — 90686 IIV4 VACC NO PRSV 0.5 ML IM: CPT | Performed by: INTERNAL MEDICINE

## 2021-12-03 PROCEDURE — 90471 IMMUNIZATION ADMIN: CPT | Performed by: INTERNAL MEDICINE

## 2021-12-03 NOTE — PROGRESS NOTES
Stef Forbes is a 60 y.o. male who presents for routine immunizations. He denies any symptoms , reactions or allergies that would exclude them from being immunized today. Risks and adverse reactions were discussed and the VIS was given to them. All questions were addressed. He refused to stay in the office for observation, was in no distress when they left.

## 2021-12-03 NOTE — PROGRESS NOTES
Eddie Palmer presents today for   Chief Complaint   Patient presents with    Hypertension     follow up       Is someone accompanying this pt? no  Is the patient using any DME equipment during OV? no    Depression Screening:  3 most recent PHQ Screens 12/3/2021   Little interest or pleasure in doing things Not at all   Feeling down, depressed, irritable, or hopeless Not at all   Total Score PHQ 2 0       Learning Assessment:  Learning Assessment 2/2/2021   PRIMARY LEARNER Patient   HIGHEST LEVEL OF EDUCATION - PRIMARY LEARNER  GRADUATED HIGH SCHOOL OR GED   BARRIERS PRIMARY LEARNER NONE   PRIMARY LANGUAGE ENGLISH   LEARNER PREFERENCE PRIMARY LISTENING   ANSWERED BY patient   RELATIONSHIP SELF         Health Maintenance reviewed and discussed and ordered per Provider. Health Maintenance Due   Topic Date Due    Hepatitis C Screening  Never done    DTaP/Tdap/Td series (1 - Tdap) Never done    Shingrix Vaccine Age 50> (1 of 2) Never done    Flu Vaccine (1) Never done    COVID-19 Vaccine (2 - Pfizer 2-dose series) 09/16/2021   . Coordination of Care:  1. \"Have you been to the ER, urgent care clinic since your last visit? Hospitalized since your last visit? \" No    2. \"Have you seen or consulted any other health care providers outside of the [address] [place] [address] since your last visit? \" No     3. For patients aged 39-70: Has the patient had a colonoscopy? Yes, HM satisfied with blue hyperlink     If the patient is female:    4. For patients aged 41-77: Has the patient had a mammogram within the past 2 years? NA based on age or sex    11. For patients aged 21-65: Has the patient had a pap smear?  NA based on age or sex

## 2021-12-03 NOTE — PROGRESS NOTES
1. Essential hypertension  Semaj's blood pressure is well controlled. He is due for CMP which I am ordering. Continue Toprol Xl and Diovan  - METABOLIC PANEL, COMPREHENSIVE    2. Mixed hyperlipidemia  To new statin. Check a lipid profile  - LIPID PANEL    3. Erectile dysfunction, unspecified erectile dysfunction type  Chepe stable. 4. Annual physical exam  Annual physical exam was performed. He had a colonoscopy 3 years ago that was normal.  His PSAs are being followed by his urologist  - HEMOGLOBIN A1C WITH EAG  - CBC WITH AUTOMATED DIFF    5. Elevated PSA  Stable    Will administer influenza vaccine today    11/18/21 Dr. Karan Aviles urology note reviewed by me    psa's not ordered by me reviewed  Results for Domenica Granado (MRN [id number]) as of 12/3/2021 11:35   Ref. Range 5/11/2021 15:21 6/10/2021 00:00 11/11/2021 14:00   Prostate Specific Ag Latest Ref Range: 0.00 - 3.50 ng/mL 5.32 (H)  3.97 (H)   PROSTATE SPECIFIC ANTIGEN, TOTAL (PSA) Unknown Rpt (A)  Rpt (A)     Chief Complaint   Patient presents with    Hypertension     follow up    htn, hld, psa, ed and physical    HPI   This is a delightful 53-year-old gentleman who presents for follow-up and also elected to have his physical exam.  He reports he has been doing great and has has been following up with Dr. Madyson Mchugh for his elevated PSAs. He denies any dysuria or change in urinary frequency. He reports his blood pressures at home fluctuate but on average they are pretty good. He has no headache or vision changes he has no chest pain palpitations or shortness of breath he has no nausea or vomiting. He is actually lost a few pounds as well. Overall he feels good.   He had a colonoscopy 3 years ago that was normal  Patient Active Problem List   Diagnosis Code    Hyperlipidemia E78.5    Gout M10.9    Hypercalcemia E83.52    Elevated PSA R97.20    Lumbar postlaminectomy syndrome M96.1    Lumbosacral spondylosis without myelopathy M47.817    DDD (degenerative disc disease), lumbar M51.36    Essential hypertension I10        Current Outpatient Medications on File Prior to Visit   Medication Sig Dispense Refill    metoprolol succinate (TOPROL-XL) 100 mg tablet take 1 tablet by mouth once daily 90 Tablet 3    allopurinoL (ZYLOPRIM) 100 mg tablet take 1 tablet by mouth once daily 90 Tablet 1    atorvastatin (LIPITOR) 20 mg tablet take 1 tablet by mouth once daily 90 Tablet 1    valsartan-hydroCHLOROthiazide (DIOVAN-HCT) 160-25 mg per tablet take 1 tablet by mouth once daily 90 Tablet 1    amLODIPine (NORVASC) 10 mg tablet take 1 tablet by mouth once daily 90 Tablet 1    diclofenac EC (VOLTAREN) 75 mg EC tablet take 1 tablet by mouth twice a day 180 Tablet 0    sildenafiL, pulmonary hypertension, (REVATIO) 20 mg tablet Take one to five tablets by mouth one hour prior to sexual activity. 90 Tab 3    omeprazole (PRILOSEC) 20 mg capsule Take 1 Cap by mouth daily. Indications: stress ulcer prevention 30 Cap 2    omega-3 fatty acids (Fish Oil Concentrate) 1,000 mg cap Take  by mouth.  colchicine 0.6 mg tablet   2     No current facility-administered medications on file prior to visit. ROS  - GEN: + weight loss, no fevers or chills  - HEENT: no vision changes, no tinnitus, no sore throat  - CV: no cp, palpitations or edema  - RESP: no sob, cough  - ABD: no n/v/d, no blood in stool  - : no dysuria or changes in freq. - SKIN: no rashes, ulcers  - Neuro: no resting tremors, parasthesia in extremities, no headaches  - MS: No weakness in extremities, no gait abnormalities  - Psych: negative for depression or anxiety      Visit Vitals  /64 Comment: manually   Pulse 63   Temp 97.6 °F (36.4 °C)   Resp 18   Ht 6' (1.829 m)   Wt 231 lb 3.2 oz (104.9 kg)   SpO2 95%   BMI 31.36 kg/m²           Physical Exam  Constitutional:       Appearance: Normal appearance. obese. NAD and pleasant  HENT:      Head: Normocephalic.       Nose: Nose normal. Mouth/Throat:      Mouth: Mucous membranes are moist. Throat not inflammed  Eyes:      Extraocular Movements: Extraocular movements intact. Conjunctiva/sclera: Conjunctivae normal. Sclera anicteric     Pupils: Pupils are equal, round, and reactive to light. Cardiovascular:      Rate and Rhythm: Normal rate and regular rhythm. Pulses: Normal pulses. Pulmonary:      Effort: No respiratory distress. Breath sounds: CTAB and No stridor. No rhonchi. Abdominal:      General: There is no distension. NT, ND  Neurological:      Mental Status: patient is alert and oriented times 3.  No resting tremor, normal gait     Cranial Nerves: cranial nerves grossly intact  Muskuloskeletal     Full ROM in extremities     Normal gait  Skin     Dry without lesions on examined areas, warm to the touch       Deferred  Psychiatry     Calm, normal affect, interacting normally

## 2022-03-03 DIAGNOSIS — I10 ESSENTIAL HYPERTENSION: ICD-10-CM

## 2022-03-03 NOTE — TELEPHONE ENCOUNTER
Last appointment with Dr Princess Whitmore was in 12/2021. Refill request received from [address] [place] [address] for Amlodipine 10 mg. Patient needs an appointment for refills.

## 2022-03-04 RX ORDER — AMLODIPINE BESYLATE 10 MG/1
10 TABLET ORAL DAILY
Qty: 30 TABLET | Refills: 1 | OUTPATIENT
Start: 2022-03-04

## 2022-03-18 PROBLEM — M47.817 LUMBOSACRAL SPONDYLOSIS WITHOUT MYELOPATHY: Status: ACTIVE | Noted: 2018-04-24

## 2022-03-19 PROBLEM — I10 ESSENTIAL HYPERTENSION: Status: ACTIVE | Noted: 2020-09-29

## 2022-03-19 PROBLEM — M51.36 DDD (DEGENERATIVE DISC DISEASE), LUMBAR: Status: ACTIVE | Noted: 2018-04-24

## 2022-03-19 PROBLEM — M96.1 LUMBAR POSTLAMINECTOMY SYNDROME: Status: ACTIVE | Noted: 2018-04-24

## 2022-03-19 PROBLEM — M51.369 DDD (DEGENERATIVE DISC DISEASE), LUMBAR: Status: ACTIVE | Noted: 2018-04-24

## 2022-04-01 ENCOUNTER — OFFICE VISIT (OUTPATIENT)
Dept: FAMILY MEDICINE CLINIC | Age: 57
End: 2022-04-01
Payer: COMMERCIAL

## 2022-04-01 VITALS
BODY MASS INDEX: 31.83 KG/M2 | HEART RATE: 67 BPM | SYSTOLIC BLOOD PRESSURE: 158 MMHG | WEIGHT: 235 LBS | RESPIRATION RATE: 18 BRPM | OXYGEN SATURATION: 94 % | DIASTOLIC BLOOD PRESSURE: 85 MMHG | HEIGHT: 72 IN

## 2022-04-01 DIAGNOSIS — M47.817 LUMBOSACRAL SPONDYLOSIS WITHOUT MYELOPATHY: ICD-10-CM

## 2022-04-01 DIAGNOSIS — E66.9 OBESITY (BMI 30.0-34.9): ICD-10-CM

## 2022-04-01 DIAGNOSIS — M1A.0290 CHRONIC GOUT OF ELBOW, UNSPECIFIED CAUSE, UNSPECIFIED LATERALITY: ICD-10-CM

## 2022-04-01 DIAGNOSIS — I10 ESSENTIAL HYPERTENSION: ICD-10-CM

## 2022-04-01 DIAGNOSIS — Z00.00 ENCOUNTER FOR PREVENTIVE HEALTH EXAMINATION: ICD-10-CM

## 2022-04-01 DIAGNOSIS — E78.2 MIXED HYPERLIPIDEMIA: Primary | ICD-10-CM

## 2022-04-01 PROCEDURE — 99214 OFFICE O/P EST MOD 30 MIN: CPT | Performed by: NURSE PRACTITIONER

## 2022-04-01 RX ORDER — ALLOPURINOL 100 MG/1
100 TABLET ORAL DAILY
Qty: 90 TABLET | Refills: 3 | Status: SHIPPED | OUTPATIENT
Start: 2022-04-01 | End: 2022-05-16 | Stop reason: SDUPTHER

## 2022-04-01 RX ORDER — METOPROLOL SUCCINATE 100 MG/1
100 TABLET, EXTENDED RELEASE ORAL DAILY
Qty: 90 TABLET | Refills: 3 | Status: SHIPPED | OUTPATIENT
Start: 2022-04-01

## 2022-04-01 RX ORDER — AMLODIPINE BESYLATE 10 MG/1
10 TABLET ORAL DAILY
Qty: 90 TABLET | Refills: 3 | Status: SHIPPED | OUTPATIENT
Start: 2022-04-01

## 2022-04-01 RX ORDER — SILDENAFIL CITRATE 20 MG/1
TABLET ORAL
Qty: 90 TABLET | Refills: 3 | Status: CANCELLED | OUTPATIENT
Start: 2022-04-01

## 2022-04-01 RX ORDER — DICLOFENAC SODIUM 75 MG/1
75 TABLET, DELAYED RELEASE ORAL 2 TIMES DAILY
Qty: 180 TABLET | Refills: 3 | Status: SHIPPED | OUTPATIENT
Start: 2022-04-01

## 2022-04-01 RX ORDER — VALSARTAN AND HYDROCHLOROTHIAZIDE 160; 25 MG/1; MG/1
1 TABLET ORAL DAILY
Qty: 90 TABLET | Refills: 3 | Status: SHIPPED | OUTPATIENT
Start: 2022-04-01

## 2022-04-01 RX ORDER — ATORVASTATIN CALCIUM 20 MG/1
20 TABLET, FILM COATED ORAL DAILY
Qty: 90 TABLET | Refills: 1 | Status: SHIPPED | OUTPATIENT
Start: 2022-04-01

## 2022-04-01 NOTE — PATIENT INSTRUCTIONS
DASH Diet: Care Instructions  Your Care Instructions     The DASH diet is an eating plan that can help lower your blood pressure. DASH stands for Dietary Approaches to Stop Hypertension. Hypertension is high blood pressure. The DASH diet focuses on eating foods that are high in calcium, potassium, and magnesium. These nutrients can lower blood pressure. The foods that are highest in these nutrients are fruits, vegetables, low-fat dairy products, nuts, seeds, and legumes. But taking calcium, potassium, and magnesium supplements instead of eating foods that are high in those nutrients does not have the same effect. The DASH diet also includes whole grains, fish, and poultry. The DASH diet is one of several lifestyle changes your doctor may recommend to lower your high blood pressure. Your doctor may also want you to decrease the amount of sodium in your diet. Lowering sodium while following the DASH diet can lower blood pressure even further than just the DASH diet alone. Follow-up care is a key part of your treatment and safety. Be sure to make and go to all appointments, and call your doctor if you are having problems. It's also a good idea to know your test results and keep a list of the medicines you take. How can you care for yourself at home? Following the DASH diet  · Eat 4 to 5 servings of fruit each day. A serving is 1 medium-sized piece of fruit, ½ cup chopped or canned fruit, 1/4 cup dried fruit, or 4 ounces (½ cup) of fruit juice. Choose fruit more often than fruit juice. · Eat 4 to 5 servings of vegetables each day. A serving is 1 cup of lettuce or raw leafy vegetables, ½ cup of chopped or cooked vegetables, or 4 ounces (½ cup) of vegetable juice. Choose vegetables more often than vegetable juice. · Get 2 to 3 servings of low-fat and fat-free dairy each day. A serving is 8 ounces of milk, 1 cup of yogurt, or 1 ½ ounces of cheese. · Eat 6 to 8 servings of grains each day.  A serving is 1 slice of bread, 1 ounce of dry cereal, or ½ cup of cooked rice, pasta, or cooked cereal. Try to choose whole-grain products as much as possible. · Limit lean meat, poultry, and fish to 2 servings each day. A serving is 3 ounces, about the size of a deck of cards. · Eat 4 to 5 servings of nuts, seeds, and legumes (cooked dried beans, lentils, and split peas) each week. A serving is 1/3 cup of nuts, 2 tablespoons of seeds, or ½ cup of cooked beans or peas. · Limit fats and oils to 2 to 3 servings each day. A serving is 1 teaspoon of vegetable oil or 2 tablespoons of salad dressing. · Limit sweets and added sugars to 5 servings or less a week. A serving is 1 tablespoon jelly or jam, ½ cup sorbet, or 1 cup of lemonade. · Eat less than 2,300 milligrams (mg) of sodium a day. If you limit your sodium to 1,500 mg a day, you can lower your blood pressure even more. · Be aware that all of these are the suggested number of servings for people who eat 1,800 to 2,000 calories a day. Your recommended number of servings may be different if you need more or fewer calories. Tips for success  · Start small. Do not try to make dramatic changes to your diet all at once. You might feel that you are missing out on your favorite foods and then be more likely to not follow the plan. Make small changes, and stick with them. Once those changes become habit, add a few more changes. · Try some of the following:  ? Make it a goal to eat a fruit or vegetable at every meal and at snacks. This will make it easy to get the recommended amount of fruits and vegetables each day. ? Try yogurt topped with fruit and nuts for a snack or healthy dessert. ? Add lettuce, tomato, cucumber, and onion to sandwiches. ? Combine a ready-made pizza crust with low-fat mozzarella cheese and lots of vegetable toppings. Try using tomatoes, squash, spinach, broccoli, carrots, cauliflower, and onions. ?  Have a variety of cut-up vegetables with a low-fat dip as an appetizer instead of chips and dip. ? Sprinkle sunflower seeds or chopped almonds over salads. Or try adding chopped walnuts or almonds to cooked vegetables. ? Try some vegetarian meals using beans and peas. Add garbanzo or kidney beans to salads. Make burritos and tacos with mashed zhang beans or black beans. Where can you learn more? Go to http://www.bacon.com/  Enter H967 in the search box to learn more about \"DASH Diet: Care Instructions. \"  Current as of: January 10, 2022               Content Version: 13.2  © 8015-5540 [employer]. Care instructions adapted under license by [employer] (which disclaims liability or warranty for this information). If you have questions about a medical condition or this instruction, always ask your healthcare professional. [address] any warranty or liability for your use of this information. High Blood Pressure: Care Instructions  Overview     It's normal for blood pressure to go up and down throughout the day. But if it stays up, you have high blood pressure. Another name for high blood pressure is hypertension. Despite what a lot of people think, high blood pressure usually doesn't cause headaches or make you feel dizzy or lightheaded. It usually has no symptoms. But it does increase your risk of stroke, heart attack, and other problems. You and your doctor will talk about your risks of these problems based on your blood pressure. Your doctor will give you a goal for your blood pressure. Your goal will be based on your health and your age. Lifestyle changes, such as eating healthy and being active, are always important to help lower blood pressure. You might also take medicine to reach your blood pressure goal.  Follow-up care is a key part of your treatment and safety. Be sure to make and go to all appointments, and call your doctor if you are having problems.  It's also a good idea to know your test results and keep a list of the medicines you take. How can you care for yourself at home? Medical treatment  · If you stop taking your medicine, your blood pressure will go back up. You may take one or more types of medicine to lower your blood pressure. Be safe with medicines. Take your medicine exactly as prescribed. Call your doctor if you think you are having a problem with your medicine. · Talk to your doctor before you start taking aspirin every day. Aspirin can help certain people lower their risk of a heart attack or stroke. But taking aspirin isn't right for everyone, because it can cause serious bleeding. · See your doctor regularly. You may need to see the doctor more often at first or until your blood pressure comes down. · If you are taking blood pressure medicine, talk to your doctor before you take decongestants or anti-inflammatory medicine, such as ibuprofen. Some of these medicines can raise blood pressure. · Learn how to check your blood pressure at home. Lifestyle changes  · Stay at a healthy weight. This is especially important if you put on weight around the waist. Losing even 10 pounds can help you lower your blood pressure. · If your doctor recommends it, get more exercise. Walking is a good choice. Bit by bit, increase the amount you walk every day. Try for at least 30 minutes on most days of the week. You also may want to swim, bike, or do other activities. · Avoid or limit alcohol. Talk to your doctor about whether you can drink any alcohol. · Try to limit how much sodium you eat to less than 2,300 milligrams (mg) a day. Your doctor may ask you to try to eat less than 1,500 mg a day. · Eat plenty of fruits (such as bananas and oranges), vegetables, legumes, whole grains, and low-fat dairy products. · Lower the amount of saturated fat in your diet. Saturated fat is found in animal products such as milk, cheese, and meat.  Limiting these foods may help you lose weight and also lower your risk for heart disease. · Do not smoke. Smoking increases your risk for heart attack and stroke. If you need help quitting, talk to your doctor about stop-smoking programs and medicines. These can increase your chances of quitting for good. When should you call for help? Call 911  anytime you think you may need emergency care. This may mean having symptoms that suggest that your blood pressure is causing a serious heart or blood vessel problem. Your blood pressure may be over 180/120. For example, call 911 if:    · You have symptoms of a heart attack. These may include:  ? Chest pain or pressure, or a strange feeling in the chest.  ? Sweating. ? Shortness of breath. ? Nausea or vomiting. ? Pain, pressure, or a strange feeling in the back, neck, jaw, or upper belly or in one or both shoulders or arms. ? Lightheadedness or sudden weakness. ? A fast or irregular heartbeat.     · You have symptoms of a stroke. These may include:  ? Sudden numbness, tingling, weakness, or loss of movement in your face, arm, or leg, especially on only one side of your body. ? Sudden vision changes. ? Sudden trouble speaking. ? Sudden confusion or trouble understanding simple statements. ? Sudden problems with walking or balance. ? A sudden, severe headache that is different from past headaches.     · You have severe back or belly pain. Do not wait until your blood pressure comes down on its own. Get help right away. Call your doctor now or seek immediate care if:    · Your blood pressure is much higher than normal (such as 180/120 or higher), but you don't have symptoms.     · You think high blood pressure is causing symptoms, such as:  ? Severe headache.  ? Blurry vision. Watch closely for changes in your health, and be sure to contact your doctor if:    · Your blood pressure measures higher than your doctor recommends at least 2 times.  That means the top number is higher or the bottom number is higher, or both.     · You think you may be having side effects from your blood pressure medicine. Where can you learn more? Go to http://www.gray.com/  Enter [id number] in the search box to learn more about \"High Blood Pressure: Care Instructions. \"  Current as of: January 10, 2022               Content Version: 13.2  © 2006-2022 [email or web address]. Care instructions adapted under license by [employer] (which disclaims liability or warranty for this information). If you have questions about a medical condition or this instruction, always ask your healthcare professional. [place] [address] any warranty or liability for your use of this information.

## 2022-04-01 NOTE — PROGRESS NOTES
History of Present Illness  Gila Lauren is a 60 y.o. male who presents today for management of hypertension chronic back pain, hyperlipidemia, and gout. Reports he usually checks his blood pressure 2-3 times a week reports systolic runs 187/462 and diastolic runs 40/24. He has no complaints today needs refills on his medication and wants to update blood work. Denies any chest pain, shortness of breath, nausea or vomiting. Appetite is good. Chief Complaint   Patient presents with    [facility]     establish care with provider was a Leanna patient            Past Medical History:   Diagnosis Date    Elevated PSA     Gout     Hypercalcemia     Hyperlipidemia     Hypertension         Past Surgical History:   Procedure Laterality Date    HX BACK SURGERY      HX COLONOSCOPY  2015    HX HIP REPLACEMENT Right     HX HIP REPLACEMENT Left     HX KNEE REPLACEMENT Left     HX OTHER SURGICAL      SHOULDER SURGERY        Current Medications  Current Outpatient Medications   Medication Sig    metoprolol succinate (TOPROL-XL) 100 mg tablet take 1 tablet by mouth once daily    allopurinoL (ZYLOPRIM) 100 mg tablet take 1 tablet by mouth once daily    atorvastatin (LIPITOR) 20 mg tablet take 1 tablet by mouth once daily    valsartan-hydroCHLOROthiazide (DIOVAN-HCT) 160-25 mg per tablet take 1 tablet by mouth once daily    amLODIPine (NORVASC) 10 mg tablet take 1 tablet by mouth once daily    diclofenac EC (VOLTAREN) 75 mg EC tablet take 1 tablet by mouth twice a day    sildenafiL, pulmonary hypertension, (REVATIO) 20 mg tablet Take one to five tablets by mouth one hour prior to sexual activity.  omeprazole (PRILOSEC) 20 mg capsule Take 1 Cap by mouth daily. Indications: stress ulcer prevention    omega-3 fatty acids (Fish Oil Concentrate) 1,000 mg cap Take  by mouth.     colchicine 0.6 mg tablet  (Patient not taking: Reported on 4/1/2022)     No current facility-administered medications for this visit. No Known Allergies       Family History   Problem Relation Age of Onset    Heart Disease Mother     Other Mother         SARCOIDOSIS/CONGESTIVE HEART FAILURE    Other Father         FAMILY HX UNKNOWN          Social History     Tobacco Use    Smoking status: Former Smoker     Quit date: 2003     Years since quittin.7    Smokeless tobacco: Never Used   Substance Use Topics    Alcohol use: Yes     Alcohol/week: 0.0 standard drinks    Drug use: No        Health Maintenance   Topic Date Due    Hepatitis C Screening  Never done    DTaP/Tdap/Td series (1 - Tdap) Never done    Shingrix Vaccine Age 50> (1 of 2) Never done    COVID-19 Vaccine (2 - Pfizer 3-dose series) 2021    Lipid Screen  2022    Depression Screen  2022    Colorectal Cancer Screening Combo  2026    Flu Vaccine  Completed    Pneumococcal 0-64 years  Aged [employer] History   Administered Date(s) Administered    Influenza Vaccine [employer]) PF (>6 Mo Flulaval, Fluarix, and >3 Yrs [address] [place] [address], Fluzone [id number]) 2021       Review of Systems  Review of Systems   All other systems reviewed and are negative. Physical Exam  Constitutional:       Appearance: Normal appearance. Cardiovascular:      Rate and Rhythm: Normal rate and regular rhythm. Pulses: Normal pulses. Heart sounds: Normal heart sounds. Pulmonary:      Effort: Pulmonary effort is normal.      Breath sounds: Normal breath sounds. Abdominal:      General: Bowel sounds are normal.   Skin:     Capillary Refill: Capillary refill takes 2 to 3 seconds. Neurological:      Mental Status: He is alert and oriented to person, place, and time. Visit Vitals  BP (!) 158/85   Pulse 67   Resp 18   Ht 6' (1.829 m)   Wt 235 lb (106.6 kg)   SpO2 94%   BMI 31.87 kg/m²          Laboratory/Tests:  No visits with results within 3 Month(s) from this visit.    Latest known visit with results is:   Clinical Support on 11/11/2021   Component Date Value Ref Range Status    Prostate Specific Ag 11/11/2021 3.97* 0.00 - 3.50 ng/mL Final    Comment: (Methodology: Roche ECLIA)              Assessment/Plan:    1. Mixed hyperlipidemia  Continue Lipitor  - atorvastatin (LIPITOR) 20 mg tablet; Take 1 Tablet by mouth daily. Dispense: 90 Tablet; Refill: 1    2. Essential hypertension  Has been out of his blood pressure medicine for a couple of weeks will restart today continue Norvasc 10 mg daily, Diovan--daily continue Toprol- mg daily  60-2 5 mg  - amLODIPine (NORVASC) 10 mg tablet; Take 1 Tablet by mouth daily. Dispense: 90 Tablet; Refill: 3  - metoprolol succinate (TOPROL-XL) 100 mg tablet; Take 1 Tablet by mouth daily. Dispense: 90 Tablet; Refill: 3  - valsartan-hydroCHLOROthiazide (DIOVAN-HCT) 160-25 mg per tablet; Take 1 Tablet by mouth daily. Dispense: 90 Tablet; Refill: 3    3. Lumbosacral spondylosis without myelopathy  Gets good relief with diclofenac continue  - diclofenac EC (VOLTAREN) 75 mg EC tablet; Take 1 Tablet by mouth two (2) times a day. Dispense: 180 Tablet; Refill: 3    4. Obesity (BMI 30.0-34. 9)  Lifestyle modifications diet and exercise discussed    - CBC WITH AUTOMATED DIFF; Future  - METABOLIC PANEL, COMPREHENSIVE; Future  - LIPID PANEL; Future    5. Chronic gout of elbow, unspecified cause, unspecified laterality  No recent gout flareups continue allopurinol  - allopurinoL (ZYLOPRIM) 100 mg tablet; Take 1 Tablet by mouth daily. Dispense: 90 Tablet; Refill: 3    6. Encounter for preventive health examination  - TSH 3RD GENERATION; Future  - HEMOGLOBIN A1C WITH EAG; Future  - HEPATITIS C AB; Future      Health Maintenance  Recommended immunizations discussed Tdap, Pneumococcal, Shingrix. These immunizations are available at local pharmacies. I have discussed the diagnosis with the patient and the intended plan as seen in the above orders.   The patient has received an after-visit summary and questions were answered concerning future plans. I have discussed medication side effects and warnings with the patient as well. I have reviewed the plan of care with the patient, accepted their input and they are in agreement with the treatment goals. Previous lab and imaging results were reviewed by me.        [address] [facility], NP-C  April 1, 2022

## 2022-04-01 NOTE — PROGRESS NOTES
Eugene Maria presents today for   Chief Complaint   Patient presents with   Hermilo Lawrence Establish Care     establish care with provider was a Katelyn Hernandez patient        Is someone accompanying this pt? No     Is the patient using any DME equipment during OV? No     Depression Screening:  3 most recent PHQ Screens 4/1/2022   Little interest or pleasure in doing things Not at all   Feeling down, depressed, irritable, or hopeless Not at all   Total Score PHQ 2 0       Learning Assessment:  Learning Assessment 2/2/2021   PRIMARY LEARNER Patient   HIGHEST LEVEL OF EDUCATION - PRIMARY LEARNER  GRADUATED HIGH SCHOOL OR GED   BARRIERS PRIMARY LEARNER NONE   PRIMARY LANGUAGE ENGLISH   LEARNER PREFERENCE PRIMARY LISTENING   ANSWERED BY patient   RELATIONSHIP SELF       Fall Risk  Fall Risk Assessment, last 12 mths 2/2/2021   Able to walk? Yes   Fall in past 12 months? 0   Do you feel unsteady? 0   Are you worried about falling 0       ADL  ADL Assessment 12/3/2021   Feeding yourself No Help Needed   Getting from bed to chair No Help Needed   Getting dressed No Help Needed   Bathing or showering No Help Needed   Walk across the room (includes cane/walker) No Help Needed   Using the telphone No Help Needed   Taking your medications No Help Needed   Preparing meals No Help Needed   Managing money (expenses/bills) No Help Needed   Moderately strenuous housework (laundry) No Help Needed   Shopping for personal items (toiletries/medicines) No Help Needed   Shopping for groceries No Help Needed   Driving No Help Needed   Climbing a flight of stairs No Help Needed   Getting to places beyond walking distances No Help Needed       Travel Screening:    Travel Screening     Question   Response    In the last 10 days, have you been in contact with someone who was confirmed or suspected to have Coronavirus/COVID-19? No / Unsure    Have you had a COVID-19 viral test in the last 10 days?   No    Do you have any of the following new or worsening symptoms? None of these    Have you traveled internationally or domestically in the last month? No      Travel History   Travel since 03/01/22    No documented travel since 03/01/22         Health Maintenance reviewed and discussed and ordered per Provider. Health Maintenance Due   Topic Date Due    Hepatitis C Screening  Never done    DTaP/Tdap/Td series (1 - Tdap) Never done    Shingrix Vaccine Age 50> (1 of 2) Never done    COVID-19 Vaccine (2 - Pfizer 3-dose series) 09/16/2021   . Coordination of Care:  1. \"Have you been to the ER, urgent care clinic since your last visit? Hospitalized since your last visit? \" No    2. \"Have you seen or consulted any other health care providers outside of the [address] [place] [address] since your last visit? \" Yes When: 12/2021 Reason for visit: Urology      3. For patients aged 39-70: Has the patient had a colonoscopy? Yes - no Care Gap present     If the patient is female:    4. For patients aged 41-77: Has the patient had a mammogram within the past 2 years? NA - based on age/sex    5. For patients aged 21-65: Has the patient had a pap smear?  NA - based on age/sex

## 2022-05-02 ENCOUNTER — HOSPITAL ENCOUNTER (OUTPATIENT)
Dept: LAB | Age: 57
Discharge: HOME | End: 2022-05-02
Payer: COMMERCIAL

## 2022-05-02 LAB
ALBUMIN SERPL-MCNC: 4.3 G/DL (ref 3.5–4.7)
ALBUMIN/GLOB SERPL: 1.3 {RATIO}
ALP SERPL-CCNC: 77 U/L (ref 38–126)
ALT SERPL-CCNC: 27 U/L (ref 3–72)
ANION GAP SERPL CALC-SCNC: 13 MMOL/L
AST SERPL W P-5'-P-CCNC: 26 U/L (ref 17–74)
BILIRUB SERPL-MCNC: 1.7 MG/DL (ref 0.2–1)
BUN SERPL-MCNC: 15 MG/DL (ref 9–21)
BUN/CREAT SERPL: 14
CA-I BLD-MCNC: 10.1 MG/DL (ref 8.5–10.5)
CHLORIDE SERPL-SCNC: 101 MMOL/L (ref 94–111)
CHOLEST SERPL-MCNC: 202 MG/DL
CO2 SERPL-SCNC: 26 MMOL/L (ref 21–33)
CREAT SERPL-MCNC: 1.1 MG/DL (ref 0.8–1.5)
EST. AVERAGE GLUCOSE BLD GHB EST-MCNC: 117 MG/DL
GLOBULIN SER CALC-MCNC: 3.3 G/DL
GLUCOSE SERPL-MCNC: 90 MG/DL (ref 70–110)
HBA1C MFR BLD: 5.7 % (ref 4.2–5.6)
HDLC SERPL-MCNC: 48 MG/DL (ref 40–60)
HDLC SERPL: 4.2 {RATIO} (ref 0–5)
LDLC SERPL CALC-MCNC: 107 MG/DL (ref 0–100)
LIPID PROFILE,FLP: ABNORMAL
POTASSIUM SERPL-SCNC: 3.5 MMOL/L (ref 3.2–5.1)
PROT SERPL-MCNC: 7.6 G/DL (ref 6.1–8.4)
SODIUM SERPL-SCNC: 140 MMOL/L (ref 135–145)
TRIGL SERPL-MCNC: 235 MG/DL (ref ?–150)
TSH SERPL DL<=0.05 MIU/L-ACNC: 1.79 UIU/ML (ref 0.35–6.2)
VLDLC SERPL CALC-MCNC: 47 MG/DL

## 2022-05-02 PROCEDURE — 80053 COMPREHEN METABOLIC PANEL: CPT

## 2022-05-02 PROCEDURE — 36415 COLL VENOUS BLD VENIPUNCTURE: CPT

## 2022-05-02 PROCEDURE — 84443 ASSAY THYROID STIM HORMONE: CPT

## 2022-05-02 PROCEDURE — 80061 LIPID PANEL: CPT

## 2022-05-02 PROCEDURE — 86803 HEPATITIS C AB TEST: CPT

## 2022-05-02 PROCEDURE — 83036 HEMOGLOBIN GLYCOSYLATED A1C: CPT

## 2022-05-03 LAB
HCV AB SER IA-ACNC: 0.37 INDEX
HCV AB SERPL QL IA: NEGATIVE
HCV COMMENT,HCGAC: NORMAL

## 2022-05-16 ENCOUNTER — TELEPHONE (OUTPATIENT)
Dept: FAMILY MEDICINE CLINIC | Age: 57
End: 2022-05-16

## 2022-05-16 DIAGNOSIS — M1A.0290 CHRONIC GOUT OF ELBOW, UNSPECIFIED CAUSE, UNSPECIFIED LATERALITY: ICD-10-CM

## 2022-05-16 RX ORDER — ALLOPURINOL 100 MG/1
100 TABLET ORAL DAILY
Qty: 90 TABLET | Refills: 3 | Status: SHIPPED | OUTPATIENT
Start: 2022-05-16

## 2022-05-16 NOTE — TELEPHONE ENCOUNTER
Patients wife called stated the pharmacy stated the RX Allopurinol was canceled and she would like to know why. Stated patient needs a refill on the RX.

## 2022-10-03 ENCOUNTER — OFFICE VISIT (OUTPATIENT)
Dept: FAMILY MEDICINE CLINIC | Age: 57
End: 2022-10-03
Payer: COMMERCIAL

## 2022-10-03 VITALS
SYSTOLIC BLOOD PRESSURE: 136 MMHG | RESPIRATION RATE: 19 BRPM | WEIGHT: 233.8 LBS | HEART RATE: 66 BPM | HEIGHT: 72 IN | DIASTOLIC BLOOD PRESSURE: 88 MMHG | OXYGEN SATURATION: 96 % | BODY MASS INDEX: 31.67 KG/M2

## 2022-10-03 DIAGNOSIS — E78.2 MIXED HYPERLIPIDEMIA: ICD-10-CM

## 2022-10-03 DIAGNOSIS — I10 ESSENTIAL HYPERTENSION: Primary | ICD-10-CM

## 2022-10-03 PROCEDURE — 99212 OFFICE O/P EST SF 10 MIN: CPT | Performed by: NURSE PRACTITIONER

## 2022-10-03 NOTE — PROGRESS NOTES
History of Present Illness  Kimberly Barroso is a 60 y.o. male who presents today for management of hypertension chronic back pain, hyperlipidemia, and gout. Reports he usually checks his blood pressure 2-3 times a week reports. Denies any chest pain, shortness of breath, nausea or vomiting. Appetite is good. He is being followed by Urology for his elevated PSA. No questions or concerns today. Chief Complaint   Patient presents with    Follow-up     6 mo follow up       Past Medical History:   Diagnosis Date    Elevated PSA     Gout     Hypercalcemia     Hyperlipidemia     Hypertension         Past Surgical History:   Procedure Laterality Date    HX BACK SURGERY      HX COLONOSCOPY  2015    HX HIP REPLACEMENT Right     HX HIP REPLACEMENT Left     HX KNEE REPLACEMENT Left     HX OTHER SURGICAL      SHOULDER SURGERY        Current Medications  Current Outpatient Medications   Medication Sig    allopurinoL (ZYLOPRIM) 100 mg tablet Take 1 Tablet by mouth daily. amLODIPine (NORVASC) 10 mg tablet Take 1 Tablet by mouth daily. atorvastatin (LIPITOR) 20 mg tablet Take 1 Tablet by mouth daily. diclofenac EC (VOLTAREN) 75 mg EC tablet Take 1 Tablet by mouth two (2) times a day. metoprolol succinate (TOPROL-XL) 100 mg tablet Take 1 Tablet by mouth daily. valsartan-hydroCHLOROthiazide (DIOVAN-HCT) 160-25 mg per tablet Take 1 Tablet by mouth daily. sildenafiL, pulmonary hypertension, (REVATIO) 20 mg tablet Take one to five tablets by mouth one hour prior to sexual activity. omega-3 fatty acids 1,000 mg cap Take  by mouth. No current facility-administered medications for this visit.          No Known Allergies       Family History   Problem Relation Age of Onset    Heart Disease Mother     Other Mother         SARCOIDOSIS/CONGESTIVE HEART FAILURE    Other Father         FAMILY HX UNKNOWN          Social History     Tobacco Use    Smoking status: Former     Packs/day: 0.50     Years: 20.00     Pack years: 10.00     Types: Cigarettes     Quit date: 2003     Years since quittin.2    Smokeless tobacco: Never   Substance Use Topics    Alcohol use: Yes     Alcohol/week: 0.0 standard drinks    Drug use: No        Health Maintenance   Topic Date Due    DTaP/Tdap/Td series (1 - Tdap) Never done    Shingrix Vaccine Age 49> (1 of 2) Never done    COVID-19 Vaccine (2 - Pfizer series) 2021    Flu Vaccine (1) 2022    Depression Screen  2023    A1C test (Diabetic or Prediabetic)  2023    Lipid Screen  2023    Colorectal Cancer Screening Combo  2026    Hepatitis C Screening  Completed    Pneumococcal 0-64 years  Aged [employer] History   Administered Date(s) Administered    Influenza, FLUARIX, FLULAVAL, FLUZONE (age 10 mo+) AND AFLURIA, (age 1 y+), PF, 0.5mL 2021       Review of Systems  Review of Systems   All other systems reviewed and are negative. Physical Exam  Constitutional:       Appearance: Normal appearance. Cardiovascular:      Rate and Rhythm: Normal rate and regular rhythm. Pulses: Normal pulses. Heart sounds: Normal heart sounds. Pulmonary:      Effort: Pulmonary effort is normal.      Breath sounds: Normal breath sounds. Abdominal:      General: Bowel sounds are normal.   Skin:     Capillary Refill: Capillary refill takes 2 to 3 seconds. Neurological:      Mental Status: He is alert and oriented to person, place, and time. Visit Vitals  /88   Pulse 66   Resp 19   Ht 6' (1.829 m)   Wt 233 lb 12.8 oz (106.1 kg)   SpO2 96%   BMI 31.71 kg/m²          Laboratory/Tests:  No visits with results within 3 Month(s) from this visit.    Latest known visit with results is:   Office Visit on 2022   Component Date Value Ref Range Status    Color (UA POC) 2022 Yellow   Final    Clarity (UA POC) 2022 Clear   Final    Glucose (UA POC) 2022 Negative  Negative Final    Bilirubin (UA POC) 2022 Negative Negative Final    Ketones (UA POC) 05/09/2022 Negative  Negative Final    Specific gravity (UA POC) 05/09/2022 1.020  1.001 - 1.035 Final    Blood (UA POC) 05/09/2022 Trace  Negative Final    pH (UA POC) 05/09/2022 5.5  4.6 - 8.0 Final    Protein (UA POC) 05/09/2022 Negative  Negative Final    Urobilinogen (UA POC) 05/09/2022 0.2 mg/dL  0.2 - 1 Final    Nitrites (UA POC) 05/09/2022 Negative  Negative Final    Leukocyte esterase (UA POC) 05/09/2022 Negative  Negative Final         Assessment/Plan:    1. Mixed hyperlipidemia  Continue Lipitor  - atorvastatin (LIPITOR) 20 mg tablet; Take 1 Tablet by mouth daily. Dispense: 90 Tablet; Refill: 1    2. Essential hypertension  Blood pressure stable continue   Norvasc 10 mg daily, Diovan--daily continue Toprol- mg daily  60-2 5 mg  - amLODIPine (NORVASC) 10 mg tablet; Take 1 Tablet by mouth daily. Dispense: 90 Tablet; Refill: 3  - metoprolol succinate (TOPROL-XL) 100 mg tablet; Take 1 Tablet by mouth daily. Dispense: 90 Tablet; Refill: 3  - valsartan-hydroCHLOROthiazide (DIOVAN-HCT) 160-25 mg per tablet; Take 1 Tablet by mouth daily. Dispense: 90 Tablet; Refill: 3     Health Maintenance  Recommended immunizations discussed Tdap, Pneumococcal, Shingrix. These immunizations are available at local pharmacies. He is up to date on his colonoscopy. I have discussed the diagnosis with the patient and the intended plan as seen in the above orders. The patient has received an after-visit summary and questions were answered concerning future plans. I have discussed medication side effects and warnings with the patient as well. I have reviewed the plan of care with the patient, accepted their input and they are in agreement with the treatment goals. Previous lab and imaging results were reviewed by me.        [facility], NP-C  October 3, 2022

## 2022-10-03 NOTE — PROGRESS NOTES
Emelina Sanchez presents today for   Chief Complaint   Patient presents with    Follow-up     6 mo follow up       Is someone accompanying this pt? No    Is the patient using any DME equipment during OV? No    Depression Screening:  3 most recent PHQ Screens 10/3/2022   Little interest or pleasure in doing things Not at all   Feeling down, depressed, irritable, or hopeless Not at all   Total Score PHQ 2 0       Learning Assessment:  Learning Assessment 2/2/2021   PRIMARY LEARNER Patient   HIGHEST LEVEL OF EDUCATION - PRIMARY LEARNER  GRADUATED HIGH SCHOOL OR GED   BARRIERS PRIMARY LEARNER NONE   PRIMARY LANGUAGE ENGLISH   LEARNER PREFERENCE PRIMARY LISTENING   ANSWERED BY patient   RELATIONSHIP SELF         Health Maintenance reviewed and discussed and ordered per Provider. Health Maintenance Due   Topic Date Due    DTaP/Tdap/Td series (1 - Tdap) Never done    Shingrix Vaccine Age 50> (1 of 2) Never done    COVID-19 Vaccine (2 - Pfizer series) 09/16/2021    Flu Vaccine (1) 08/01/2022   . Coordination of Care:  1. \"Have you been to the ER, urgent care clinic since your last visit? Hospitalized since your last visit? \" No    2. \"Have you seen or consulted any other health care providers outside of the [address] [place] [address] since your last visit? \" No     3. For patients aged 39-70: Has the patient had a colonoscopy? Yes - Care Gap present. Most recent result on file     If the patient is female:    4. For patients aged 41-77: Has the patient had a mammogram within the past 2 years? No    5. For patients aged 21-65: Has the patient had a pap smear?  No

## 2022-11-26 DIAGNOSIS — E78.2 MIXED HYPERLIPIDEMIA: ICD-10-CM

## 2022-11-27 RX ORDER — ATORVASTATIN CALCIUM 20 MG/1
TABLET, FILM COATED ORAL
Qty: 90 TABLET | Refills: 1 | Status: SHIPPED | OUTPATIENT
Start: 2022-11-27

## 2022-12-30 DIAGNOSIS — I10 ESSENTIAL HYPERTENSION: ICD-10-CM

## 2022-12-30 RX ORDER — VALSARTAN AND HYDROCHLOROTHIAZIDE 160; 25 MG/1; MG/1
1 TABLET ORAL DAILY
Qty: 90 TABLET | Refills: 3 | Status: SHIPPED | OUTPATIENT
Start: 2022-12-30

## 2022-12-30 RX ORDER — METOPROLOL SUCCINATE 100 MG/1
100 TABLET, EXTENDED RELEASE ORAL DAILY
Qty: 90 TABLET | Refills: 0 | Status: CANCELLED | OUTPATIENT
Start: 2022-12-30

## 2023-02-27 RX ORDER — AMLODIPINE BESYLATE 10 MG/1
TABLET ORAL
Qty: 90 TABLET | Refills: 0 | Status: SHIPPED | OUTPATIENT
Start: 2023-02-27

## 2023-03-09 RX ORDER — AMLODIPINE BESYLATE 10 MG/1
TABLET ORAL
Qty: 90 TABLET | Refills: 0 | OUTPATIENT
Start: 2023-03-09

## 2023-05-01 SDOH — HEALTH STABILITY: PHYSICAL HEALTH: ON AVERAGE, HOW MANY DAYS PER WEEK DO YOU ENGAGE IN MODERATE TO STRENUOUS EXERCISE (LIKE A BRISK WALK)?: 3 DAYS

## 2023-05-01 SDOH — HEALTH STABILITY: PHYSICAL HEALTH: ON AVERAGE, HOW MANY MINUTES DO YOU ENGAGE IN EXERCISE AT THIS LEVEL?: 10 MIN

## 2023-05-03 ENCOUNTER — OFFICE VISIT (OUTPATIENT)
Facility: CLINIC | Age: 58
End: 2023-05-03
Payer: COMMERCIAL

## 2023-05-03 ENCOUNTER — HOSPITAL ENCOUNTER (OUTPATIENT)
Age: 58
Discharge: HOME | End: 2023-05-06
Payer: COMMERCIAL

## 2023-05-03 VITALS
TEMPERATURE: 98.6 F | RESPIRATION RATE: 19 BRPM | SYSTOLIC BLOOD PRESSURE: 132 MMHG | OXYGEN SATURATION: 97 % | DIASTOLIC BLOOD PRESSURE: 80 MMHG | HEIGHT: 71 IN | BODY MASS INDEX: 32.09 KG/M2 | WEIGHT: 229.2 LBS | HEART RATE: 71 BPM

## 2023-05-03 DIAGNOSIS — I10 ESSENTIAL HYPERTENSION: Primary | ICD-10-CM

## 2023-05-03 DIAGNOSIS — M10.9 GOUT, UNSPECIFIED CAUSE, UNSPECIFIED CHRONICITY, UNSPECIFIED SITE: ICD-10-CM

## 2023-05-03 DIAGNOSIS — R73.03 PREDIABETES: ICD-10-CM

## 2023-05-03 DIAGNOSIS — R97.20 ELEVATED PSA: ICD-10-CM

## 2023-05-03 DIAGNOSIS — E78.5 HYPERLIPIDEMIA, UNSPECIFIED HYPERLIPIDEMIA TYPE: ICD-10-CM

## 2023-05-03 LAB
ALBUMIN SERPL-MCNC: 4.1 G/DL (ref 3.4–5)
ALBUMIN/GLOB SERPL: 1 (ref 0.8–1.7)
ALP SERPL-CCNC: 100 U/L (ref 45–117)
ALT SERPL-CCNC: 37 U/L (ref 16–61)
ANION GAP SERPL CALC-SCNC: 7 MMOL/L (ref 3–18)
AST SERPL W P-5'-P-CCNC: 33 U/L (ref 10–38)
BASOPHILS # BLD: 0.1 K/UL (ref 0–0.1)
BASOPHILS NFR BLD: 1 % (ref 0–2)
BILIRUB SERPL-MCNC: 1.2 MG/DL (ref 0.2–1)
BUN SERPL-MCNC: 16 MG/DL (ref 7–18)
BUN/CREAT SERPL: 14 (ref 12–20)
CA-I BLD-MCNC: 10 MG/DL (ref 8.5–10.1)
CHLORIDE SERPL-SCNC: 104 MMOL/L (ref 100–111)
CHOLEST SERPL-MCNC: 176 MG/DL
CO2 SERPL-SCNC: 27 MMOL/L (ref 21–32)
CREAT SERPL-MCNC: 1.14 MG/DL (ref 0.6–1.3)
DIFFERENTIAL METHOD BLD: ABNORMAL
EOSINOPHIL # BLD: 0.3 K/UL (ref 0–0.4)
EOSINOPHIL NFR BLD: 6 % (ref 0–5)
ERYTHROCYTE [DISTWIDTH] IN BLOOD BY AUTOMATED COUNT: 13.2 % (ref 11.6–14.5)
EST. AVERAGE GLUCOSE BLD GHB EST-MCNC: 120 MG/DL
GLOBULIN SER CALC-MCNC: 4.1 G/DL (ref 2–4)
GLUCOSE SERPL-MCNC: 98 MG/DL (ref 74–99)
HBA1C MFR BLD: 5.8 % (ref 4.2–5.6)
HCT VFR BLD AUTO: 45.2 % (ref 36–48)
HDLC SERPL-MCNC: 49 MG/DL (ref 40–60)
HDLC SERPL: 3.6 (ref 0–5)
HGB BLD-MCNC: 14.5 G/DL (ref 13–16)
IMM GRANULOCYTES # BLD AUTO: 0 K/UL (ref 0–0.04)
IMM GRANULOCYTES NFR BLD AUTO: 0 % (ref 0–0.5)
LDLC SERPL CALC-MCNC: 90.8 MG/DL (ref 0–100)
LIPID PANEL: ABNORMAL
LYMPHOCYTES # BLD: 1.3 K/UL (ref 0.9–3.6)
LYMPHOCYTES NFR BLD: 30 % (ref 21–52)
MCH RBC QN AUTO: 26.6 PG (ref 24–34)
MCHC RBC AUTO-ENTMCNC: 32.1 G/DL (ref 31–37)
MCV RBC AUTO: 82.9 FL (ref 78–100)
MONOCYTES # BLD: 0.5 K/UL (ref 0.05–1.2)
MONOCYTES NFR BLD: 10 % (ref 3–10)
NEUTS SEG # BLD: 2.3 K/UL (ref 1.8–8)
NEUTS SEG NFR BLD: 53 % (ref 40–73)
NRBC # BLD: 0 K/UL (ref 0–0.01)
NRBC BLD-RTO: 0 PER 100 WBC
PLATELET # BLD AUTO: 322 K/UL (ref 135–420)
PMV BLD AUTO: 10.4 FL (ref 9.2–11.8)
POTASSIUM SERPL-SCNC: 3.3 MMOL/L (ref 3.5–5.5)
PROT SERPL-MCNC: 8.2 G/DL (ref 6.4–8.2)
RBC # BLD AUTO: 5.45 M/UL (ref 4.35–5.65)
SODIUM SERPL-SCNC: 138 MMOL/L (ref 136–145)
TRIGL SERPL-MCNC: 181 MG/DL
VLDLC SERPL CALC-MCNC: 36.2 MG/DL
WBC # BLD AUTO: 4.4 K/UL (ref 4.6–13.2)

## 2023-05-03 PROCEDURE — 87389 HIV-1 AG W/HIV-1&-2 AB AG IA: CPT

## 2023-05-03 PROCEDURE — 80053 COMPREHEN METABOLIC PANEL: CPT

## 2023-05-03 PROCEDURE — 3079F DIAST BP 80-89 MM HG: CPT | Performed by: STUDENT IN AN ORGANIZED HEALTH CARE EDUCATION/TRAINING PROGRAM

## 2023-05-03 PROCEDURE — 36415 COLL VENOUS BLD VENIPUNCTURE: CPT

## 2023-05-03 PROCEDURE — 80061 LIPID PANEL: CPT

## 2023-05-03 PROCEDURE — 83036 HEMOGLOBIN GLYCOSYLATED A1C: CPT

## 2023-05-03 PROCEDURE — 99214 OFFICE O/P EST MOD 30 MIN: CPT | Performed by: STUDENT IN AN ORGANIZED HEALTH CARE EDUCATION/TRAINING PROGRAM

## 2023-05-03 PROCEDURE — 3075F SYST BP GE 130 - 139MM HG: CPT | Performed by: STUDENT IN AN ORGANIZED HEALTH CARE EDUCATION/TRAINING PROGRAM

## 2023-05-03 PROCEDURE — 85025 COMPLETE CBC W/AUTO DIFF WBC: CPT

## 2023-05-03 RX ORDER — DICLOFENAC SODIUM 75 MG/1
75 TABLET, DELAYED RELEASE ORAL 2 TIMES DAILY PRN
Qty: 180 TABLET | Refills: 3 | Status: SHIPPED | OUTPATIENT
Start: 2023-05-03

## 2023-05-03 RX ORDER — AMLODIPINE BESYLATE 10 MG/1
10 TABLET ORAL DAILY
Qty: 90 TABLET | Refills: 3 | Status: SHIPPED | OUTPATIENT
Start: 2023-05-03

## 2023-05-03 RX ORDER — ALLOPURINOL 100 MG/1
100 TABLET ORAL DAILY
Qty: 90 TABLET | Refills: 3 | Status: SHIPPED | OUTPATIENT
Start: 2023-05-03

## 2023-05-03 RX ORDER — METOPROLOL SUCCINATE 100 MG/1
100 TABLET, EXTENDED RELEASE ORAL DAILY
Qty: 90 TABLET | Refills: 3 | Status: SHIPPED | OUTPATIENT
Start: 2023-05-03

## 2023-05-03 RX ORDER — VALSARTAN AND HYDROCHLOROTHIAZIDE 160; 25 MG/1; MG/1
1 TABLET ORAL DAILY
Qty: 90 TABLET | Refills: 3 | Status: SHIPPED | OUTPATIENT
Start: 2023-05-03

## 2023-05-03 RX ORDER — ATORVASTATIN CALCIUM 20 MG/1
20 TABLET, FILM COATED ORAL DAILY
Qty: 90 TABLET | Refills: 3 | Status: SHIPPED | OUTPATIENT
Start: 2023-05-03

## 2023-05-03 ASSESSMENT — PATIENT HEALTH QUESTIONNAIRE - PHQ9
1. LITTLE INTEREST OR PLEASURE IN DOING THINGS: 0
2. FEELING DOWN, DEPRESSED OR HOPELESS: 0
SUM OF ALL RESPONSES TO PHQ QUESTIONS 1-9: 0
SUM OF ALL RESPONSES TO PHQ QUESTIONS 1-9: 0
SUM OF ALL RESPONSES TO PHQ9 QUESTIONS 1 & 2: 0
SUM OF ALL RESPONSES TO PHQ QUESTIONS 1-9: 0
SUM OF ALL RESPONSES TO PHQ QUESTIONS 1-9: 0

## 2023-05-03 NOTE — PROGRESS NOTES
Bincarri Pollock presents today for   Chief Complaint   Patient presents with    New Patient     Here to establish care - former [place] patient       Is someone accompanying this pt? no    Is the patient using any DME equipment during OV? no    Health Maintenance reviewed and discussed and ordered per Provider. Health Maintenance Due   Topic Date Due    HIV screen  Never done    DTaP/Tdap/Td vaccine (1 - Tdap) Never done    Shingles vaccine (1 of 2) Never done    COVID-19 Vaccine (2 - Pfizer series) 09/16/2021    A1C test (Diabetic or Prediabetic)  05/02/2023    Lipids  05/02/2023   . Coordination of Care:  1. \"Have you been to the ER, urgent care clinic since your last visit? Hospitalized since your last visit? \" No    2. \"Have you seen or consulted any other health care providers outside of the [address] [place] [address] since your last visit? \" No    3. For patients aged 39-70: Has the patient had a colonoscopy? Yes- No care gap present    If the patient is female:    4. For patients aged 41-77: Has the patient had a mammogram within the past 2 years? N/A based on age/sex    5. For patients aged 21-65: Has the patient had a pap smear?  N/A based on age/sex

## 2023-05-03 NOTE — PROGRESS NOTES
Chronic Disease Follow up    Pedro Juarez (: 1965) is a 59 y.o. male here for evaluation of the following chief concerns(s):  New Patient (Here to establish care - former [place] patient)       ASSESSMENT/PLAN:  1. Essential hypertension  -     HIV 1/2 Ag/Ab, 4TH Generation,W Rflx Confirm; Future  -     Lipid Panel; Future  -     Comprehensive Metabolic Panel; Future  -     CBC with Auto Differential; Future  2. Elevated PSA  3. Prediabetes  -     Hemoglobin A1C; Future  4. Hyperlipidemia, unspecified hyperlipidemia type  5. Gout, unspecified cause, unspecified chronicity, unspecified site    Orders Placed This Encounter   Procedures    HIV 1/2 Ag/Ab, 4TH Generation,W Rflx Confirm     Standing Status:   Future     Standing Expiration Date:   5/3/2024    Hemoglobin A1C     Standing Status:   Future     Standing Expiration Date:   5/3/2024    Lipid Panel     Standing Status:   Future     Standing Expiration Date:   5/3/2024    Comprehensive Metabolic Panel     Standing Status:   Future     Standing Expiration Date:   5/3/2024    CBC with Auto Differential     Standing Status:   Future     Standing Expiration Date:   5/3/2024     HTN- stable. Continue Amlodipine, Metoprolol, Valsartan/HCTZ. Recheck yearly labs     Prediabetes- diet/exercise counseling. Recheck A1c     HLD- stable. Continue Lipitor. Recheck lipids    Gout- stable. Continue Allopurinol, Diclofenac as needed. Elevated PSA- following with urology, will follow along    Return in about 6 months (around 11/3/2023). Patient agrees with plan as above and has no additional questions at this time. SUBJECTIVE/OBJECTIVE:    Patient presents for follow up chronic disease, EOC. This is a new patient to me, was previously following with [employer], NP. Histories have been reviewed and updated per chart below.     HTN   Home blood pressures:well controlled (<140/90) for most readings  Complications: none  Compliant with current medications,

## 2023-05-05 LAB
HIV 1+2 AB+HIV1 P24 AG SERPL QL IA: NONREACTIVE
HIV 1/2 RESULT COMMENT: NORMAL

## 2023-10-30 ENCOUNTER — OFFICE VISIT (OUTPATIENT)
Facility: CLINIC | Age: 58
End: 2023-10-30
Payer: COMMERCIAL

## 2023-10-30 VITALS
BODY MASS INDEX: 31.78 KG/M2 | DIASTOLIC BLOOD PRESSURE: 78 MMHG | TEMPERATURE: 98.2 F | HEIGHT: 71 IN | OXYGEN SATURATION: 96 % | SYSTOLIC BLOOD PRESSURE: 134 MMHG | WEIGHT: 227 LBS | HEART RATE: 67 BPM | RESPIRATION RATE: 18 BRPM

## 2023-10-30 DIAGNOSIS — R97.20 ELEVATED PSA: ICD-10-CM

## 2023-10-30 DIAGNOSIS — R21 RASH: ICD-10-CM

## 2023-10-30 DIAGNOSIS — E78.5 HYPERLIPIDEMIA, UNSPECIFIED HYPERLIPIDEMIA TYPE: Primary | ICD-10-CM

## 2023-10-30 DIAGNOSIS — I10 ESSENTIAL HYPERTENSION: ICD-10-CM

## 2023-10-30 DIAGNOSIS — M47.817 LUMBOSACRAL SPONDYLOSIS WITHOUT MYELOPATHY: ICD-10-CM

## 2023-10-30 DIAGNOSIS — M10.9 GOUT, UNSPECIFIED CAUSE, UNSPECIFIED CHRONICITY, UNSPECIFIED SITE: ICD-10-CM

## 2023-10-30 DIAGNOSIS — R73.03 PREDIABETES: ICD-10-CM

## 2023-10-30 PROCEDURE — 3075F SYST BP GE 130 - 139MM HG: CPT | Performed by: STUDENT IN AN ORGANIZED HEALTH CARE EDUCATION/TRAINING PROGRAM

## 2023-10-30 PROCEDURE — 3078F DIAST BP <80 MM HG: CPT | Performed by: STUDENT IN AN ORGANIZED HEALTH CARE EDUCATION/TRAINING PROGRAM

## 2023-10-30 PROCEDURE — 99214 OFFICE O/P EST MOD 30 MIN: CPT | Performed by: STUDENT IN AN ORGANIZED HEALTH CARE EDUCATION/TRAINING PROGRAM

## 2023-10-30 RX ORDER — TRIAMCINOLONE ACETONIDE 0.25 MG/G
OINTMENT TOPICAL
Qty: 15 G | Refills: 1 | Status: SHIPPED | OUTPATIENT
Start: 2023-10-30 | End: 2023-11-06

## 2023-10-30 SDOH — ECONOMIC STABILITY: HOUSING INSECURITY
IN THE LAST 12 MONTHS, WAS THERE A TIME WHEN YOU DID NOT HAVE A STEADY PLACE TO SLEEP OR SLEPT IN A SHELTER (INCLUDING NOW)?: NO

## 2023-10-30 SDOH — ECONOMIC STABILITY: FOOD INSECURITY: WITHIN THE PAST 12 MONTHS, THE FOOD YOU BOUGHT JUST DIDN'T LAST AND YOU DIDN'T HAVE MONEY TO GET MORE.: NEVER TRUE

## 2023-10-30 SDOH — ECONOMIC STABILITY: INCOME INSECURITY: HOW HARD IS IT FOR YOU TO PAY FOR THE VERY BASICS LIKE FOOD, HOUSING, MEDICAL CARE, AND HEATING?: NOT VERY HARD

## 2023-10-30 SDOH — ECONOMIC STABILITY: FOOD INSECURITY: WITHIN THE PAST 12 MONTHS, YOU WORRIED THAT YOUR FOOD WOULD RUN OUT BEFORE YOU GOT MONEY TO BUY MORE.: NEVER TRUE

## 2023-10-30 NOTE — PROGRESS NOTES
Dulce Krueger presents today for   Chief Complaint   Patient presents with    Follow-up     6m follow up       Is someone accompanying this pt? no    Is the patient using any DME equipment during OV? no    Health Maintenance reviewed and discussed and ordered per Provider. Health Maintenance Due   Topic Date Due    DTaP/Tdap/Td vaccine (1 - Tdap) Never done    Shingles vaccine (1 of 2) Never done    COVID-19 Vaccine (2 - Pfizer series) 10/21/2021    Flu vaccine (1) 08/01/2023   . Coordination of Care:  1. \"Have you been to the ER, urgent care clinic since your last visit? Hospitalized since your last visit? \" No    2. \"Have you seen or consulted any other health care providers outside of the [address] [place] [address] since your last visit? \" No    3. For patients aged 43-73: Has the patient had a colonoscopy? Yes- No care gap present    If the patient is female:    4. For patients aged 43-66: Has the patient had a mammogram within the past 2 years? N/A based on age/sex    5. For patients aged 21-65: Has the patient had a pap smear?  N/A based on age/sex

## 2023-10-30 NOTE — PROGRESS NOTES
Chronic Disease Follow up    Fredrich Meckel (: 1965) is a 59 y.o. male here for evaluation of the following chief concerns(s):  Follow-up (6m follow up)       ASSESSMENT/PLAN:  1. Hyperlipidemia, unspecified hyperlipidemia type  2. Lumbosacral spondylosis without myelopathy  3. Essential hypertension  4. Gout, unspecified cause, unspecified chronicity, unspecified site  5. Elevated PSA  6. Prediabetes  7. Rash  -     triamcinolone (KENALOG) 0.025 % ointment; Apply topically 2 times daily as needed for itching and rash, Disp-15 g, R-1, Normal      No orders of the defined types were placed in this encounter. HTN- stable. Continue Amlodipine, Valsartan/HCTZ    Prediabetes- stable. Continue diet/exercise    HLD- stable. Continue Lipitor. Gout- stable. Continue Allopurinol, Diclofenac as needed. Elevated PSA- following with urology, will follow along    Rash- back sounds like it may be tinea versicolor, will trial selsun blue shampoo. Buttocks looks like possibly calcinosis cutis? Does have some scaling overtop, will treat symptomatically with steroid ointment for itching- consider dermatology referral at follow up    Return in about 6 months (around 2024). Patient agrees with plan as above and has no additional questions at this time. SUBJECTIVE/OBJECTIVE:    Patient presents for follow up chronic disease. HTN   Home blood pressures:well controlled (<140/90) for most readings  Complications: none  Compliant with current medications, tolerating well. ROS: No frequent headaches, chest pain, SOB, leg swelling, dizziness    Arthritis, back pain  Hx of back surgery back in  for DDD- fusion  Pain very well  controlled currently- only hurts if lots of activity    Elevated PSA, BPH  Follows [facility] [place]   MRI of prostate done in  was PIRADS 3  Most recent PSA is 5.7- rechecking every 6 months.  Reviewed last note     Gout   On Allopurinol for prevention  On Diclofenac as

## 2023-12-12 RX ORDER — ATORVASTATIN CALCIUM 20 MG/1
20 TABLET, FILM COATED ORAL DAILY
Qty: 90 TABLET | Refills: 1 | Status: SHIPPED | OUTPATIENT
Start: 2023-12-12

## 2023-12-12 RX ORDER — DICLOFENAC SODIUM 75 MG/1
75 TABLET, DELAYED RELEASE ORAL 2 TIMES DAILY PRN
Qty: 180 TABLET | Refills: 1 | Status: SHIPPED | OUTPATIENT
Start: 2023-12-12

## 2023-12-12 RX ORDER — AMLODIPINE BESYLATE 10 MG/1
10 TABLET ORAL DAILY
Qty: 90 TABLET | Refills: 1 | Status: SHIPPED | OUTPATIENT
Start: 2023-12-12

## 2023-12-12 RX ORDER — ALLOPURINOL 100 MG/1
100 TABLET ORAL DAILY
Qty: 90 TABLET | Refills: 1 | Status: SHIPPED | OUTPATIENT
Start: 2023-12-12

## 2023-12-28 RX ORDER — VALSARTAN AND HYDROCHLOROTHIAZIDE 160; 25 MG/1; MG/1
1 TABLET ORAL DAILY
Qty: 90 TABLET | Refills: 1 | Status: SHIPPED | OUTPATIENT
Start: 2023-12-28

## 2024-05-16 ENCOUNTER — HOSPITAL ENCOUNTER (OUTPATIENT)
Age: 59
Discharge: HOME | End: 2024-05-16
Payer: COMMERCIAL

## 2024-05-16 ENCOUNTER — OFFICE VISIT (OUTPATIENT)
Facility: CLINIC | Age: 59
End: 2024-05-16
Payer: COMMERCIAL

## 2024-05-16 VITALS
HEART RATE: 63 BPM | DIASTOLIC BLOOD PRESSURE: 82 MMHG | RESPIRATION RATE: 17 BRPM | OXYGEN SATURATION: 97 % | WEIGHT: 224 LBS | BODY MASS INDEX: 31.36 KG/M2 | SYSTOLIC BLOOD PRESSURE: 139 MMHG | TEMPERATURE: 97.5 F | HEIGHT: 71 IN

## 2024-05-16 DIAGNOSIS — E78.5 HYPERLIPIDEMIA, UNSPECIFIED HYPERLIPIDEMIA TYPE: ICD-10-CM

## 2024-05-16 DIAGNOSIS — M10.9 GOUT, UNSPECIFIED CAUSE, UNSPECIFIED CHRONICITY, UNSPECIFIED SITE: ICD-10-CM

## 2024-05-16 DIAGNOSIS — L98.9 SKIN LESION: ICD-10-CM

## 2024-05-16 DIAGNOSIS — I10 ESSENTIAL HYPERTENSION: ICD-10-CM

## 2024-05-16 DIAGNOSIS — R73.03 PREDIABETES: Primary | ICD-10-CM

## 2024-05-16 DIAGNOSIS — R73.03 PREDIABETES: ICD-10-CM

## 2024-05-16 LAB
ALBUMIN SERPL-MCNC: 4.2 G/DL (ref 3.4–5)
ALBUMIN/GLOB SERPL: 1 (ref 0.8–1.7)
ALP SERPL-CCNC: 96 U/L (ref 45–117)
ALT SERPL-CCNC: 37 U/L (ref 16–61)
ANION GAP SERPL CALC-SCNC: 11 MMOL/L (ref 3–18)
AST SERPL W P-5'-P-CCNC: 26 U/L (ref 10–38)
BASOPHILS # BLD: 0.1 K/UL (ref 0–0.1)
BASOPHILS NFR BLD: 1 % (ref 0–2)
BILIRUB SERPL-MCNC: 1.6 MG/DL (ref 0.2–1)
BUN SERPL-MCNC: 13 MG/DL (ref 7–18)
BUN/CREAT SERPL: 12 (ref 12–20)
CA-I BLD-MCNC: 10 MG/DL (ref 8.5–10.1)
CHLORIDE SERPL-SCNC: 103 MMOL/L (ref 100–111)
CO2 SERPL-SCNC: 27 MMOL/L (ref 21–32)
CREAT SERPL-MCNC: 1.11 MG/DL (ref 0.6–1.3)
DIFFERENTIAL METHOD BLD: ABNORMAL
EOSINOPHIL # BLD: 0.3 K/UL (ref 0–0.4)
EOSINOPHIL NFR BLD: 6 % (ref 0–5)
ERYTHROCYTE [DISTWIDTH] IN BLOOD BY AUTOMATED COUNT: 13 % (ref 11.6–14.5)
GLOBULIN SER CALC-MCNC: 4.1 G/DL (ref 2–4)
GLUCOSE SERPL-MCNC: 97 MG/DL (ref 74–99)
HCT VFR BLD AUTO: 45.8 % (ref 36–48)
HGB BLD-MCNC: 15.7 G/DL (ref 13–16)
IMM GRANULOCYTES # BLD AUTO: 0 K/UL (ref 0–0.04)
IMM GRANULOCYTES NFR BLD AUTO: 0 % (ref 0–0.5)
LYMPHOCYTES # BLD: 1.6 K/UL (ref 0.9–3.6)
LYMPHOCYTES NFR BLD: 35 % (ref 21–52)
MCH RBC QN AUTO: 27.5 PG (ref 24–34)
MCHC RBC AUTO-ENTMCNC: 34.3 G/DL (ref 31–37)
MCV RBC AUTO: 80.2 FL (ref 78–100)
MONOCYTES # BLD: 0.6 K/UL (ref 0.05–1.2)
MONOCYTES NFR BLD: 12 % (ref 3–10)
NEUTS SEG # BLD: 2.1 K/UL (ref 1.8–8)
NEUTS SEG NFR BLD: 46 % (ref 40–73)
NRBC # BLD: 0 K/UL (ref 0–0.01)
NRBC BLD-RTO: 0 PER 100 WBC
PLATELET # BLD AUTO: 333 K/UL (ref 135–420)
PMV BLD AUTO: 9.5 FL (ref 9.2–11.8)
POTASSIUM SERPL-SCNC: 3.2 MMOL/L (ref 3.5–5.5)
PROT SERPL-MCNC: 8.3 G/DL (ref 6.4–8.2)
RBC # BLD AUTO: 5.71 M/UL (ref 4.35–5.65)
SODIUM SERPL-SCNC: 141 MMOL/L (ref 136–145)
WBC # BLD AUTO: 4.7 K/UL (ref 4.6–13.2)

## 2024-05-16 PROCEDURE — 85025 COMPLETE CBC W/AUTO DIFF WBC: CPT

## 2024-05-16 PROCEDURE — 83036 HEMOGLOBIN GLYCOSYLATED A1C: CPT

## 2024-05-16 PROCEDURE — 3079F DIAST BP 80-89 MM HG: CPT | Performed by: STUDENT IN AN ORGANIZED HEALTH CARE EDUCATION/TRAINING PROGRAM

## 2024-05-16 PROCEDURE — 80053 COMPREHEN METABOLIC PANEL: CPT

## 2024-05-16 PROCEDURE — 80061 LIPID PANEL: CPT

## 2024-05-16 PROCEDURE — 99214 OFFICE O/P EST MOD 30 MIN: CPT | Performed by: STUDENT IN AN ORGANIZED HEALTH CARE EDUCATION/TRAINING PROGRAM

## 2024-05-16 PROCEDURE — 3075F SYST BP GE 130 - 139MM HG: CPT | Performed by: STUDENT IN AN ORGANIZED HEALTH CARE EDUCATION/TRAINING PROGRAM

## 2024-05-16 PROCEDURE — 36415 COLL VENOUS BLD VENIPUNCTURE: CPT

## 2024-05-16 RX ORDER — ATORVASTATIN CALCIUM 20 MG/1
20 TABLET, FILM COATED ORAL DAILY
Qty: 90 TABLET | Refills: 3 | Status: SHIPPED | OUTPATIENT
Start: 2024-05-16

## 2024-05-16 RX ORDER — VALSARTAN AND HYDROCHLOROTHIAZIDE 160; 25 MG/1; MG/1
1 TABLET ORAL DAILY
Qty: 90 TABLET | Refills: 3 | Status: SHIPPED | OUTPATIENT
Start: 2024-05-16

## 2024-05-16 RX ORDER — AMLODIPINE BESYLATE 10 MG/1
10 TABLET ORAL DAILY
Qty: 90 TABLET | Refills: 3 | Status: SHIPPED | OUTPATIENT
Start: 2024-05-16

## 2024-05-16 RX ORDER — ALLOPURINOL 100 MG/1
100 TABLET ORAL DAILY
Qty: 90 TABLET | Refills: 3 | Status: SHIPPED | OUTPATIENT
Start: 2024-05-16

## 2024-05-16 RX ORDER — DICLOFENAC SODIUM 75 MG/1
75 TABLET, DELAYED RELEASE ORAL 2 TIMES DAILY PRN
Qty: 180 TABLET | Refills: 3 | Status: SHIPPED | OUTPATIENT
Start: 2024-05-16

## 2024-05-16 ASSESSMENT — PATIENT HEALTH QUESTIONNAIRE - PHQ9
1. LITTLE INTEREST OR PLEASURE IN DOING THINGS: NOT AT ALL
SUM OF ALL RESPONSES TO PHQ QUESTIONS 1-9: 0
2. FEELING DOWN, DEPRESSED OR HOPELESS: NOT AT ALL
SUM OF ALL RESPONSES TO PHQ QUESTIONS 1-9: 0
SUM OF ALL RESPONSES TO PHQ9 QUESTIONS 1 & 2: 0
SUM OF ALL RESPONSES TO PHQ QUESTIONS 1-9: 0
SUM OF ALL RESPONSES TO PHQ QUESTIONS 1-9: 0

## 2024-05-16 NOTE — PROGRESS NOTES
Marcell Valladares presents today for   Chief Complaint   Patient presents with    Follow-up     6m follow up        Is someone accompanying this pt? no    Is the patient using any DME equipment during OV? no    Health Maintenance reviewed and discussed and ordered per Provider.    Health Maintenance Due   Topic Date Due    DTaP/Tdap/Td vaccine (1 - Tdap) Never done    Shingles vaccine (1 of 2) Never done    COVID-19 Vaccine (2 - 2023-24 season) 09/01/2023    Respiratory Syncytial Virus (RSV) Pregnant or age 60 yrs+ (1 - 1-dose 60+ series) Never done    A1C test (Diabetic or Prediabetic)  05/03/2024    Lipids  05/03/2024    Depression Screen  05/03/2024   .      \"Have you been to the ER, urgent care clinic since your last visit?  Hospitalized since your last visit?\"    NO    “Have you seen or consulted any other health care providers outside of [facility] since your last visit?”    NO

## 2024-05-16 NOTE — PROGRESS NOTES
Chronic Disease Follow up    Marcell Valladares (: 1965) is a 58 y.o. male here for evaluation of the following chief concerns(s):  Follow-up (6m follow up )       ASSESSMENT/PLAN:  1. Prediabetes  -     Hemoglobin A1C; Future  2. Essential hypertension  -     Lipid Panel; Future  -     Comprehensive Metabolic Panel; Future  -     CBC with Auto Differential; Future  3. Gout, unspecified cause, unspecified chronicity, unspecified site  -     Hemoglobin A1C; Future  4. Hyperlipidemia, unspecified hyperlipidemia type  -     Lipid Panel; Future  5. Skin lesion  -     External Referral To Dermatology        Orders Placed This Encounter   Procedures    Hemoglobin A1C     Standing Status:   Future     Standing Expiration Date:   2025    Lipid Panel     Standing Status:   Future     Standing Expiration Date:   2025    Comprehensive Metabolic Panel     Standing Status:   Future     Standing Expiration Date:   2025    CBC with Auto Differential     Standing Status:   Future     Standing Expiration Date:   2025    External Referral To Dermatology     Referral Priority:   Routine     Referral Type:   Eval and Treat     Referral Reason:   Specialty Services Required     Requested Specialty:   Dermatology     Number of Visits Requested:   1     HTN- stable. Continue Amlodipine, Valsartan/HCTZ    Prediabetes- stable. Continue diet/exercise    HLD- stable. Continue Lipitor.    Gout- stable. Continue Allopurinol, Diclofenac as needed.     Elevated PSA- following with urology, will follow along    Skin lesion on buttocks is ongoing- steroid cream did not help. Refer to dermatology for further evaluation. Calcinosis cutis? Tophi related to gout?    Return in about 6 months (around 2024).    Patient agrees with plan as above and has no additional questions at this time.     SUBJECTIVE/OBJECTIVE:    Patient presents for follow up chronic disease.     HTN   Complications: none  Compliant with current

## 2024-05-17 DIAGNOSIS — E87.6 HYPOKALEMIA: Primary | ICD-10-CM

## 2024-05-17 LAB
CHOLEST SERPL-MCNC: 164 MG/DL
EST. AVERAGE GLUCOSE BLD GHB EST-MCNC: 108 MG/DL
HBA1C MFR BLD: 5.4 % (ref 4.2–5.6)
HDLC SERPL-MCNC: 48 MG/DL (ref 40–60)
HDLC SERPL: 3.4 (ref 0–5)
LDLC SERPL CALC-MCNC: 83.4 MG/DL (ref 0–100)
LIPID PANEL: ABNORMAL
TRIGL SERPL-MCNC: 163 MG/DL
VLDLC SERPL CALC-MCNC: 32.6 MG/DL

## 2024-05-17 RX ORDER — POTASSIUM CHLORIDE 20 MEQ/1
20 TABLET, EXTENDED RELEASE ORAL DAILY
Qty: 90 TABLET | Refills: 1 | Status: SHIPPED | OUTPATIENT
Start: 2024-05-17

## 2024-11-27 ENCOUNTER — OFFICE VISIT (OUTPATIENT)
Facility: CLINIC | Age: 59
End: 2024-11-27
Payer: COMMERCIAL

## 2024-11-27 VITALS
OXYGEN SATURATION: 97 % | RESPIRATION RATE: 17 BRPM | WEIGHT: 227.4 LBS | SYSTOLIC BLOOD PRESSURE: 150 MMHG | HEIGHT: 71 IN | HEART RATE: 65 BPM | DIASTOLIC BLOOD PRESSURE: 90 MMHG | TEMPERATURE: 97.5 F | BODY MASS INDEX: 31.84 KG/M2

## 2024-11-27 DIAGNOSIS — E78.5 HYPERLIPIDEMIA, UNSPECIFIED HYPERLIPIDEMIA TYPE: ICD-10-CM

## 2024-11-27 DIAGNOSIS — R97.20 ELEVATED PSA: ICD-10-CM

## 2024-11-27 DIAGNOSIS — R73.03 PREDIABETES: ICD-10-CM

## 2024-11-27 DIAGNOSIS — I10 ESSENTIAL HYPERTENSION: Primary | ICD-10-CM

## 2024-11-27 DIAGNOSIS — M10.9 GOUT, UNSPECIFIED CAUSE, UNSPECIFIED CHRONICITY, UNSPECIFIED SITE: ICD-10-CM

## 2024-11-27 DIAGNOSIS — L28.0 LICHEN SIMPLEX CHRONICUS: ICD-10-CM

## 2024-11-27 PROCEDURE — 3077F SYST BP >= 140 MM HG: CPT | Performed by: STUDENT IN AN ORGANIZED HEALTH CARE EDUCATION/TRAINING PROGRAM

## 2024-11-27 PROCEDURE — 3080F DIAST BP >= 90 MM HG: CPT | Performed by: STUDENT IN AN ORGANIZED HEALTH CARE EDUCATION/TRAINING PROGRAM

## 2024-11-27 PROCEDURE — 99214 OFFICE O/P EST MOD 30 MIN: CPT | Performed by: STUDENT IN AN ORGANIZED HEALTH CARE EDUCATION/TRAINING PROGRAM

## 2024-11-27 RX ORDER — VALSARTAN AND HYDROCHLOROTHIAZIDE 160; 25 MG/1; MG/1
1 TABLET ORAL DAILY
Qty: 90 TABLET | Refills: 3 | Status: CANCELLED | OUTPATIENT
Start: 2024-11-27

## 2024-11-27 RX ORDER — VALSARTAN AND HYDROCHLOROTHIAZIDE 320; 25 MG/1; MG/1
1 TABLET, FILM COATED ORAL DAILY
Qty: 30 TABLET | Refills: 3 | Status: SHIPPED | OUTPATIENT
Start: 2024-11-27

## 2024-11-27 RX ORDER — TRIAMCINOLONE ACETONIDE 1 MG/G
1 CREAM TOPICAL
COMMUNITY
Start: 2024-08-21

## 2024-11-27 SDOH — ECONOMIC STABILITY: FOOD INSECURITY: WITHIN THE PAST 12 MONTHS, YOU WORRIED THAT YOUR FOOD WOULD RUN OUT BEFORE YOU GOT MONEY TO BUY MORE.: NEVER TRUE

## 2024-11-27 SDOH — ECONOMIC STABILITY: FOOD INSECURITY: WITHIN THE PAST 12 MONTHS, THE FOOD YOU BOUGHT JUST DIDN'T LAST AND YOU DIDN'T HAVE MONEY TO GET MORE.: NEVER TRUE

## 2024-11-27 SDOH — ECONOMIC STABILITY: INCOME INSECURITY: HOW HARD IS IT FOR YOU TO PAY FOR THE VERY BASICS LIKE FOOD, HOUSING, MEDICAL CARE, AND HEATING?: NOT HARD AT ALL

## 2024-11-27 NOTE — PROGRESS NOTES
Marcell Valladares presents today for   Chief Complaint   Patient presents with    6 Month Follow-Up     Patient still the rash from previous visit.       Is someone accompanying this pt? no    Is the patient using any DME equipment during OV? no    Health Maintenance Due   Topic Date Due    DTaP/Tdap/Td vaccine (1 - Tdap) Never done    Shingles vaccine (1 of 2) Never done    Flu vaccine (1) 08/01/2024    COVID-19 Vaccine (2 - 2023-24 season) 09/01/2024       \"Have you been to the ER, urgent care clinic since your last visit?  Hospitalized since your last visit?\"    NO    “Have you seen or consulted any other health care providers outside of [facility] since your last visit?”    YES - When: approximately 3 months ago.  Where and Why: Dermatologist.

## 2024-11-27 NOTE — PROGRESS NOTES
Chronic Disease Follow up    Marcell Valladares (: 1965) is a 59 y.o. male here for evaluation of the following chief concerns(s):  6 Month Follow-Up (Patient still the rash from previous visit.)       ASSESSMENT/PLAN:  1. Essential hypertension  -     valsartan-hydroCHLOROthiazide (DIOVAN-HCT) 320-25 MG per tablet; Take 1 tablet by mouth daily, Disp-30 tablet, R-3Normal  2. Elevated PSA  3. Gout, unspecified cause, unspecified chronicity, unspecified site  4. Hyperlipidemia, unspecified hyperlipidemia type  5. Lichen simplex chronicus  6. Prediabetes    No orders of the defined types were placed in this encounter.    HTN- Uncontrolled. Continue Amlodipine, Increase Valsartan to 320/25.  Home blood pressure monitoring and bring log to follow-up.    Prediabetes, Obseity- A1c is stable. Recommend diet/exercise/weight loss. Starting with cutting down on portions, using a smaller plate to \"trick\" the brain, avoiding sugar sweetened beverages and drinking at least 64oz of water per day.     HLD- stable. Continue Lipitor.    Gout- stable. Continue Allopurinol, Diclofenac as needed.     Elevated PSA- following with urology, will follow along    Lichen Simplex- did see dermatology, Triamcinolone is controlling the itch    Snoring, witnessed apnea-discussed risks and consequences of untreated RICHELLE-patient declines sleep study for now, may reconsider in the spring.  Recommend weight loss in the interim-see plan per above    Return for 2 week BP check nurse visit, 6 months chronic disease .    Patient agrees with plan as above and has no additional questions at this time.     SUBJECTIVE/OBJECTIVE:    Patient presents for follow up chronic disease.  He feels well overall.  No concerns or complaints.  He denies any chest pain or shortness of breath.  No recent gout flares.  Arthritis pain is overall well-controlled.  His blood pressure is elevated today.  He does not check regularly at home although he does have a home blood

## 2024-12-11 ENCOUNTER — NURSE ONLY (OUTPATIENT)
Facility: CLINIC | Age: 59
End: 2024-12-11

## 2024-12-11 VITALS — DIASTOLIC BLOOD PRESSURE: 82 MMHG | SYSTOLIC BLOOD PRESSURE: 130 MMHG

## 2024-12-11 PROCEDURE — 3079F DIAST BP 80-89 MM HG: CPT | Performed by: STUDENT IN AN ORGANIZED HEALTH CARE EDUCATION/TRAINING PROGRAM

## 2024-12-11 PROCEDURE — 90000 NO LOS: CPT | Performed by: STUDENT IN AN ORGANIZED HEALTH CARE EDUCATION/TRAINING PROGRAM

## 2024-12-11 PROCEDURE — 3075F SYST BP GE 130 - 139MM HG: CPT | Performed by: STUDENT IN AN ORGANIZED HEALTH CARE EDUCATION/TRAINING PROGRAM

## 2024-12-11 NOTE — PROGRESS NOTES
Patient here for nurse visit to recheck blood pressure per order of Lulu Stoddard MD.     Are you currently experiencing any of the following:  Dizziness: no  Blurry vision: no  Headache: no  Chest pain or discomfort: no  Speech difficulties: no    In the past 30-60 minutes have you:  Smoked: no  Drank caffeine or alcohol: no  Participated in any strenuous activity: no    Patient was seated for at least 5 minutes before blood pressure was read. Patient's blood pressure is 146 /84. Today's readings were relayed to Lulu Stoddard MD. Lulu Stoddard MD said continue to take same medication. Relayed information to patient and they expressed understanding.

## 2025-05-28 RX ORDER — ATORVASTATIN CALCIUM 20 MG/1
20 TABLET, FILM COATED ORAL DAILY
Qty: 90 TABLET | Refills: 0 | Status: SHIPPED | OUTPATIENT
Start: 2025-05-28

## 2025-05-28 NOTE — TELEPHONE ENCOUNTER
Patient called requesting a refill on medication. Would like for prescription to be sent to [facility] in [place], [place].

## 2025-06-13 DIAGNOSIS — I10 ESSENTIAL HYPERTENSION: ICD-10-CM

## 2025-06-16 RX ORDER — VALSARTAN AND HYDROCHLOROTHIAZIDE 320; 25 MG/1; MG/1
1 TABLET, FILM COATED ORAL DAILY
Qty: 90 TABLET | Refills: 0 | Status: SHIPPED | OUTPATIENT
Start: 2025-06-16

## 2025-06-16 RX ORDER — AMLODIPINE BESYLATE 10 MG/1
10 TABLET ORAL DAILY
Qty: 90 TABLET | Refills: 1 | Status: SHIPPED | OUTPATIENT
Start: 2025-06-16

## 2025-06-17 RX ORDER — ALLOPURINOL 100 MG/1
100 TABLET ORAL DAILY
Qty: 90 TABLET | Refills: 1 | Status: SHIPPED | OUTPATIENT
Start: 2025-06-17

## 2025-07-16 RX ORDER — DICLOFENAC SODIUM 75 MG/1
75 TABLET, DELAYED RELEASE ORAL 2 TIMES DAILY PRN
Qty: 180 TABLET | Refills: 0 | Status: SHIPPED | OUTPATIENT
Start: 2025-07-16

## 2025-08-05 ENCOUNTER — TELEPHONE (OUTPATIENT)
Facility: CLINIC | Age: 60
End: 2025-08-05

## 2025-08-05 ENCOUNTER — HOSPITAL ENCOUNTER (OUTPATIENT)
Age: 60
Setting detail: SPECIMEN
Discharge: HOME | End: 2025-08-08
Payer: COMMERCIAL

## 2025-08-05 ENCOUNTER — OFFICE VISIT (OUTPATIENT)
Facility: CLINIC | Age: 60
End: 2025-08-05
Payer: COMMERCIAL

## 2025-08-05 VITALS
WEIGHT: 227 LBS | DIASTOLIC BLOOD PRESSURE: 81 MMHG | BODY MASS INDEX: 31.78 KG/M2 | RESPIRATION RATE: 18 BRPM | OXYGEN SATURATION: 99 % | TEMPERATURE: 98 F | HEIGHT: 71 IN | HEART RATE: 67 BPM | SYSTOLIC BLOOD PRESSURE: 133 MMHG

## 2025-08-05 DIAGNOSIS — Z00.00 ENCOUNTER FOR PHYSICAL EXAMINATION: Primary | ICD-10-CM

## 2025-08-05 DIAGNOSIS — R73.03 PREDIABETES: ICD-10-CM

## 2025-08-05 DIAGNOSIS — K64.8 INTERNAL HEMORRHOIDS: ICD-10-CM

## 2025-08-05 DIAGNOSIS — I10 ESSENTIAL HYPERTENSION: ICD-10-CM

## 2025-08-05 DIAGNOSIS — E78.5 HYPERLIPIDEMIA, UNSPECIFIED HYPERLIPIDEMIA TYPE: ICD-10-CM

## 2025-08-05 DIAGNOSIS — M10.9 GOUT, UNSPECIFIED CAUSE, UNSPECIFIED CHRONICITY, UNSPECIFIED SITE: ICD-10-CM

## 2025-08-05 DIAGNOSIS — R97.20 ELEVATED PSA: ICD-10-CM

## 2025-08-05 LAB
ALBUMIN SERPL-MCNC: 4.3 G/DL (ref 3.4–5)
ALBUMIN/GLOB SERPL: 1.2
ALP SERPL-CCNC: 101 U/L (ref 45–117)
ALT SERPL-CCNC: 43 U/L (ref 10–50)
ANION GAP SERPL CALC-SCNC: 12 MMOL/L
AST SERPL W P-5'-P-CCNC: 41 U/L (ref 10–38)
BILIRUB SERPL-MCNC: 1.1 MG/DL (ref 0.2–1)
BUN SERPL-MCNC: 20 MG/DL (ref 6–23)
BUN/CREAT SERPL: 18
CA-I BLD-MCNC: 10.5 MG/DL (ref 8.5–10.1)
CHLORIDE SERPL-SCNC: 99 MMOL/L (ref 98–107)
CHOLEST SERPL-MCNC: 155 MG/DL
CO2 SERPL-SCNC: 27 MMOL/L (ref 21–32)
CREAT SERPL-MCNC: 1.11 MG/DL (ref 0.6–1.3)
ERYTHROCYTE [DISTWIDTH] IN BLOOD BY AUTOMATED COUNT: 13 % (ref 11.6–14.5)
EST. AVERAGE GLUCOSE BLD GHB EST-MCNC: 114 MG/DL
GLOBULIN SER CALC-MCNC: 3.6 G/DL
GLUCOSE SERPL-MCNC: 99 MG/DL (ref 74–108)
HBA1C MFR BLD: 5.6 % (ref 4.2–5.6)
HCT VFR BLD AUTO: 44.2 % (ref 36–48)
HDLC SERPL-MCNC: 44 MG/DL (ref 40–60)
HDLC SERPL: 3.6 (ref 0–5)
HGB BLD-MCNC: 14.8 G/DL (ref 13–16)
LDLC SERPL CALC-MCNC: 73 MG/DL (ref 0–100)
MCH RBC QN AUTO: 27.7 PG (ref 24–34)
MCHC RBC AUTO-ENTMCNC: 33.5 G/DL (ref 31–37)
MCV RBC AUTO: 82.6 FL (ref 78–100)
NRBC # BLD: 0 K/UL (ref 0–0.01)
NRBC BLD-RTO: 0 PER 100 WBC
PLATELET # BLD AUTO: 323 K/UL (ref 135–420)
PMV BLD AUTO: 9.8 FL (ref 9.2–11.8)
POTASSIUM SERPL-SCNC: 3.4 MMOL/L (ref 3.5–5.5)
PROT SERPL-MCNC: 7.9 G/DL (ref 6.4–8.2)
RBC # BLD AUTO: 5.35 M/UL (ref 4.35–5.65)
SODIUM SERPL-SCNC: 138 MMOL/L (ref 136–145)
TRIGL SERPL-MCNC: 193 MG/DL (ref 0–150)
VLDLC SERPL CALC-MCNC: 39 MG/DL
WBC # BLD AUTO: 5.5 K/UL (ref 4.6–13.2)

## 2025-08-05 PROCEDURE — 99214 OFFICE O/P EST MOD 30 MIN: CPT | Performed by: STUDENT IN AN ORGANIZED HEALTH CARE EDUCATION/TRAINING PROGRAM

## 2025-08-05 PROCEDURE — 99396 PREV VISIT EST AGE 40-64: CPT | Performed by: STUDENT IN AN ORGANIZED HEALTH CARE EDUCATION/TRAINING PROGRAM

## 2025-08-05 PROCEDURE — 3075F SYST BP GE 130 - 139MM HG: CPT | Performed by: STUDENT IN AN ORGANIZED HEALTH CARE EDUCATION/TRAINING PROGRAM

## 2025-08-05 PROCEDURE — 83036 HEMOGLOBIN GLYCOSYLATED A1C: CPT

## 2025-08-05 PROCEDURE — 36415 COLL VENOUS BLD VENIPUNCTURE: CPT

## 2025-08-05 PROCEDURE — 85027 COMPLETE CBC AUTOMATED: CPT

## 2025-08-05 PROCEDURE — 3079F DIAST BP 80-89 MM HG: CPT | Performed by: STUDENT IN AN ORGANIZED HEALTH CARE EDUCATION/TRAINING PROGRAM

## 2025-08-05 PROCEDURE — 80061 LIPID PANEL: CPT

## 2025-08-05 PROCEDURE — 80053 COMPREHEN METABOLIC PANEL: CPT

## 2025-08-05 RX ORDER — POTASSIUM CHLORIDE 1500 MG/1
20 TABLET, EXTENDED RELEASE ORAL DAILY
Qty: 90 TABLET | Refills: 1 | Status: SHIPPED | OUTPATIENT
Start: 2025-08-05

## 2025-08-05 SDOH — ECONOMIC STABILITY: FOOD INSECURITY: WITHIN THE PAST 12 MONTHS, YOU WORRIED THAT YOUR FOOD WOULD RUN OUT BEFORE YOU GOT MONEY TO BUY MORE.: PATIENT DECLINED

## 2025-08-05 SDOH — ECONOMIC STABILITY: FOOD INSECURITY: WITHIN THE PAST 12 MONTHS, THE FOOD YOU BOUGHT JUST DIDN'T LAST AND YOU DIDN'T HAVE MONEY TO GET MORE.: PATIENT DECLINED

## 2025-08-05 ASSESSMENT — ENCOUNTER SYMPTOMS
CONSTIPATION: 1
RECTAL PAIN: 1
RESPIRATORY NEGATIVE: 1
EYES NEGATIVE: 1

## 2025-08-05 ASSESSMENT — PATIENT HEALTH QUESTIONNAIRE - PHQ9
SUM OF ALL RESPONSES TO PHQ QUESTIONS 1-9: 0
1. LITTLE INTEREST OR PLEASURE IN DOING THINGS: NOT AT ALL
SUM OF ALL RESPONSES TO PHQ QUESTIONS 1-9: 0
2. FEELING DOWN, DEPRESSED OR HOPELESS: NOT AT ALL
SUM OF ALL RESPONSES TO PHQ QUESTIONS 1-9: 0
SUM OF ALL RESPONSES TO PHQ QUESTIONS 1-9: 0

## 2025-08-25 RX ORDER — ATORVASTATIN CALCIUM 20 MG/1
20 TABLET, FILM COATED ORAL DAILY
Qty: 90 TABLET | Refills: 0 | Status: SHIPPED | OUTPATIENT
Start: 2025-08-25